# Patient Record
Sex: FEMALE | Race: BLACK OR AFRICAN AMERICAN | NOT HISPANIC OR LATINO
[De-identification: names, ages, dates, MRNs, and addresses within clinical notes are randomized per-mention and may not be internally consistent; named-entity substitution may affect disease eponyms.]

---

## 2017-01-31 ENCOUNTER — APPOINTMENT (OUTPATIENT)
Dept: OBGYN | Facility: CLINIC | Age: 57
End: 2017-01-31

## 2017-01-31 VITALS
HEART RATE: 70 BPM | DIASTOLIC BLOOD PRESSURE: 92 MMHG | BODY MASS INDEX: 32.67 KG/M2 | SYSTOLIC BLOOD PRESSURE: 142 MMHG | HEIGHT: 65 IN | WEIGHT: 196.1 LBS

## 2017-01-31 DIAGNOSIS — N92.6 IRREGULAR MENSTRUATION, UNSPECIFIED: ICD-10-CM

## 2017-02-10 LAB
CYTOLOGY CVX/VAG DOC THIN PREP: NORMAL
HPV HIGH+LOW RISK DNA PNL CVX: NEGATIVE

## 2017-02-14 ENCOUNTER — APPOINTMENT (OUTPATIENT)
Dept: OBGYN | Facility: CLINIC | Age: 57
End: 2017-02-14

## 2017-03-03 ENCOUNTER — APPOINTMENT (OUTPATIENT)
Dept: OBGYN | Facility: CLINIC | Age: 57
End: 2017-03-03

## 2017-03-03 VITALS
SYSTOLIC BLOOD PRESSURE: 147 MMHG | HEART RATE: 81 BPM | DIASTOLIC BLOOD PRESSURE: 91 MMHG | HEIGHT: 65 IN | BODY MASS INDEX: 31.16 KG/M2 | WEIGHT: 187 LBS

## 2017-03-06 RX ORDER — AMOXICILLIN 875 MG/1
875 TABLET, FILM COATED ORAL
Qty: 20 | Refills: 0 | Status: COMPLETED | COMMUNITY
Start: 2016-09-27

## 2017-03-06 RX ORDER — AMOXICILLIN AND CLAVULANATE POTASSIUM 875; 125 MG/1; MG/1
875-125 TABLET, COATED ORAL
Qty: 14 | Refills: 0 | Status: COMPLETED | COMMUNITY
Start: 2017-02-01

## 2017-04-12 ENCOUNTER — OUTPATIENT (OUTPATIENT)
Dept: OUTPATIENT SERVICES | Facility: HOSPITAL | Age: 57
LOS: 1 days | End: 2017-04-12
Payer: COMMERCIAL

## 2017-04-12 VITALS
RESPIRATION RATE: 15 BRPM | HEART RATE: 78 BPM | TEMPERATURE: 98 F | HEIGHT: 64.25 IN | WEIGHT: 192.02 LBS | DIASTOLIC BLOOD PRESSURE: 78 MMHG | SYSTOLIC BLOOD PRESSURE: 122 MMHG

## 2017-04-12 DIAGNOSIS — S83.519A SPRAIN OF ANTERIOR CRUCIATE LIGAMENT OF UNSPECIFIED KNEE, INITIAL ENCOUNTER: Chronic | ICD-10-CM

## 2017-04-12 DIAGNOSIS — Z98.51 TUBAL LIGATION STATUS: Chronic | ICD-10-CM

## 2017-04-12 DIAGNOSIS — D25.9 LEIOMYOMA OF UTERUS, UNSPECIFIED: ICD-10-CM

## 2017-04-12 LAB
BLD GP AB SCN SERPL QL: NEGATIVE — SIGNIFICANT CHANGE UP
BUN SERPL-MCNC: 21 MG/DL — SIGNIFICANT CHANGE UP (ref 7–23)
CALCIUM SERPL-MCNC: 10.5 MG/DL — SIGNIFICANT CHANGE UP (ref 8.4–10.5)
CHLORIDE SERPL-SCNC: 100 MMOL/L — SIGNIFICANT CHANGE UP (ref 98–107)
CO2 SERPL-SCNC: 31 MMOL/L — SIGNIFICANT CHANGE UP (ref 22–31)
CREAT SERPL-MCNC: 1.28 MG/DL — SIGNIFICANT CHANGE UP (ref 0.5–1.3)
GLUCOSE SERPL-MCNC: 83 MG/DL — SIGNIFICANT CHANGE UP (ref 70–99)
HCT VFR BLD CALC: 37.3 % — SIGNIFICANT CHANGE UP (ref 34.5–45)
HGB BLD-MCNC: 11.8 G/DL — SIGNIFICANT CHANGE UP (ref 11.5–15.5)
MCHC RBC-ENTMCNC: 25.3 PG — LOW (ref 27–34)
MCHC RBC-ENTMCNC: 31.6 % — LOW (ref 32–36)
MCV RBC AUTO: 79.9 FL — LOW (ref 80–100)
PLATELET # BLD AUTO: 259 K/UL — SIGNIFICANT CHANGE UP (ref 150–400)
PMV BLD: 10.6 FL — SIGNIFICANT CHANGE UP (ref 7–13)
POTASSIUM SERPL-MCNC: 3.3 MMOL/L — LOW (ref 3.5–5.3)
POTASSIUM SERPL-SCNC: 3.3 MMOL/L — LOW (ref 3.5–5.3)
RBC # BLD: 4.67 M/UL — SIGNIFICANT CHANGE UP (ref 3.8–5.2)
RBC # FLD: 15.6 % — HIGH (ref 10.3–14.5)
RH IG SCN BLD-IMP: POSITIVE — SIGNIFICANT CHANGE UP
SODIUM SERPL-SCNC: 145 MMOL/L — SIGNIFICANT CHANGE UP (ref 135–145)
WBC # BLD: 3.43 K/UL — LOW (ref 3.8–10.5)
WBC # FLD AUTO: 3.43 K/UL — LOW (ref 3.8–10.5)

## 2017-04-12 PROCEDURE — 93010 ELECTROCARDIOGRAM REPORT: CPT

## 2017-04-12 NOTE — H&P PST ADULT - GENITOURINARY COMMENTS
hx of post menopausal vaginal bleeding, hx of fibroids.  Pt reports fibroid had enlarged on transvaginal ultrasound. Now scheduled for Dilation curettage Hysteroscopy, margarito resectoscope, myomectomy 4/24/17.

## 2017-04-12 NOTE — H&P PST ADULT - NSANTHOSAYNRD_GEN_A_CORE
No. AUGUSTIN screening performed.  STOP BANG Legend: 0-2 = LOW Risk; 3-4 = INTERMEDIATE Risk; 5-8 = HIGH Risk

## 2017-04-12 NOTE — H&P PST ADULT - VISION (WITH CORRECTIVE LENSES IF THE PATIENT USUALLY WEARS THEM):
wearing contacts at this time instructed to wear glasses for day of surgery/Partially impaired: cannot see medication labels or newsprint, but can see obstacles in path, and the surrounding layout; can count fingers at arm's length

## 2017-04-12 NOTE — H&P PST ADULT - ASSESSMENT
57 y/o with hx of post menopausal vaginal bleeding, hx of fibroids.  Pt reports fibroid had enlarged on transvaginal ultrasound. Now scheduled for Dilation curettage Hysteroscopy, [place] resectoscope, myomectomy 4/24/17.

## 2017-04-12 NOTE — H&P PST ADULT - PROBLEM SELECTOR PLAN 1
Dilation curettage Hysteroscopy, margarito resectoscope, myomectomy 4/24/17.      CBC BMP T&S EKG    AUGUSTIN precautions, per Stop Bang Criteria, OR booking informed

## 2017-04-12 NOTE — H&P PST ADULT - HISTORY OF PRESENT ILLNESS
55 y/o with hx of post menopausal vaginal bleeding, hx of fibroids.  Pt reports fibroid had enlarged on transvaginal ultrasound. Now scheduled for Dilation curettage Hysteroscopy, margarito resectoscope, myomectomy 4/24/17.

## 2017-04-21 NOTE — ASU PATIENT PROFILE, ADULT - PSH
ACL (anterior cruciate ligament) tear  ACL reconstruction 2016 (L)  History of bilateral tubal ligation  1999

## 2017-04-23 ENCOUNTER — RESULT REVIEW (OUTPATIENT)
Age: 57
End: 2017-04-23

## 2017-04-24 ENCOUNTER — OUTPATIENT (OUTPATIENT)
Dept: OUTPATIENT SERVICES | Facility: HOSPITAL | Age: 57
LOS: 1 days | Discharge: HOME | End: 2017-04-24
Payer: COMMERCIAL

## 2017-04-24 ENCOUNTER — APPOINTMENT (OUTPATIENT)
Dept: OBGYN | Facility: HOSPITAL | Age: 57
End: 2017-04-24

## 2017-04-24 VITALS
RESPIRATION RATE: 15 BRPM | DIASTOLIC BLOOD PRESSURE: 77 MMHG | OXYGEN SATURATION: 95 % | HEART RATE: 82 BPM | SYSTOLIC BLOOD PRESSURE: 114 MMHG

## 2017-04-24 VITALS
TEMPERATURE: 98 F | HEART RATE: 80 BPM | OXYGEN SATURATION: 97 % | DIASTOLIC BLOOD PRESSURE: 79 MMHG | SYSTOLIC BLOOD PRESSURE: 132 MMHG | WEIGHT: 192.02 LBS | RESPIRATION RATE: 15 BRPM | HEIGHT: 64.25 IN

## 2017-04-24 DIAGNOSIS — D25.9 LEIOMYOMA OF UTERUS, UNSPECIFIED: ICD-10-CM

## 2017-04-24 DIAGNOSIS — Z98.51 TUBAL LIGATION STATUS: Chronic | ICD-10-CM

## 2017-04-24 DIAGNOSIS — S83.519A SPRAIN OF ANTERIOR CRUCIATE LIGAMENT OF UNSPECIFIED KNEE, INITIAL ENCOUNTER: Chronic | ICD-10-CM

## 2017-04-24 LAB — RH IG SCN BLD-IMP: POSITIVE — SIGNIFICANT CHANGE UP

## 2017-04-24 PROCEDURE — 88305 TISSUE EXAM BY PATHOLOGIST: CPT | Mod: 26

## 2017-04-24 PROCEDURE — 58561 HYSTEROSCOPY REMOVE MYOMA: CPT

## 2017-04-24 PROCEDURE — 58120 DILATION AND CURETTAGE: CPT

## 2017-04-24 RX ORDER — SODIUM CHLORIDE 9 MG/ML
1000 INJECTION, SOLUTION INTRAVENOUS
Qty: 0 | Refills: 0 | Status: DISCONTINUED | OUTPATIENT
Start: 2017-04-24 | End: 2017-04-25

## 2017-04-24 NOTE — ASU DISCHARGE PLAN (ADULT/PEDIATRIC). - ACTIVITY LEVEL
weight bearing as tolerated/no douching/no intercourse/nothing per vagina/no tub baths/no tampons/no exercise

## 2017-04-24 NOTE — ASU DISCHARGE PLAN (ADULT/PEDIATRIC). - ITEMS TO FOLLOWUP WITH YOUR PHYSICIAN'S
Follow up with Dr Delaney in 2 weeks for a postoperative appointment. Call the office for appointment confirmation or with any concerns. Take pain medication as you need it. DO NOT take sabina back (contains aspirin) with ibuprofen (NSAID), you may take tylenol with it.

## 2017-04-24 NOTE — ASU DISCHARGE PLAN (ADULT/PEDIATRIC). - NURSING INSTRUCTIONS
You were given 1000mg IV Tylenol for pain management.  Please DO NOT take any Tylenol containing products, such as  Vicodin, Percocet, Excedrin, many cold preparations for the next 8 hours (until 9p).  DO NOT EXCEED 3000MG OF TYLENOL OVER 24 HOURS.   You were given Toradol for pain management. Please DO Not take Motrin/Ibuprofen/Advil/Aleve (NSAIDS) for the next 6 hours (Until 645p)

## 2017-04-24 NOTE — ASU DISCHARGE PLAN (ADULT/PEDIATRIC). - MEDICATION SUMMARY - MEDICATIONS TO TAKE
I will START or STAY ON the medications listed below when I get home from the hospital:    garlic oral tablet 100 mg  -- 1 tab(s) by mouth once a day in am last dose on 4/12/17  -- Indication: For home med    magnesium 500 mg (OTC)  -- 1 tab(s) by mouth once a day  -- Indication: For home med    enzymes (OTC)  -- 1 cap(s) by mouth once a day last dose on 4/12/17  -- Indication: For home med    ibuprofen 600 mg oral tablet  -- 1 tab(s) by mouth every 6 hours, As Needed  -- Indication: For postoperative pain    Rosalind Back & Body 500 mg-32.5 mg oral tablet  -- 2 tab(s) by mouth every 6 hours, As Needed  last dose on 4/8/17  -- Indication: For home med, do not take with ibuprofen    ZyrTEC  -- 1 tab(s) by mouth once a day  -- Indication: For home med    Micardis HCT 40 mg-12.5 mg oral tablet  -- 1 tab(s) by mouth once a day in am  -- Indication: For home med    Flonase 50 mcg/inh nasal spray  -- 1 spray(s) into nose once a day in am  -- Indication: For home med    Probiotic Formula oral capsule  -- 1 cap(s) by mouth once a day  -- Indication: For home med    Vitamin D3 1000 intl units oral capsule  -- 1 cap(s) by mouth once a day in am  -- Indication: For home med    biotin 5000 mcg oral tablet, disintegrating  -- 1 tab(s) by mouth once a day in am last dose on 4/12/17  -- Indication: For home med

## 2017-04-24 NOTE — ASU DISCHARGE PLAN (ADULT/PEDIATRIC). - NOTIFY
Bleeding that does not stop/Pain not relieved by Medications/Unable to Urinate/GYN Fever>100.4/Persistent Nausea and Vomiting

## 2017-04-26 ENCOUNTER — TRANSCRIPTION ENCOUNTER (OUTPATIENT)
Age: 57
End: 2017-04-26

## 2017-05-11 ENCOUNTER — APPOINTMENT (OUTPATIENT)
Dept: OBGYN | Facility: CLINIC | Age: 57
End: 2017-05-11

## 2017-05-11 VITALS
HEART RATE: 97 BPM | BODY MASS INDEX: 32.82 KG/M2 | HEIGHT: 65 IN | WEIGHT: 197 LBS | SYSTOLIC BLOOD PRESSURE: 136 MMHG | DIASTOLIC BLOOD PRESSURE: 99 MMHG

## 2017-05-11 DIAGNOSIS — Z86.018 PERSONAL HISTORY OF OTHER BENIGN NEOPLASM: ICD-10-CM

## 2017-05-11 DIAGNOSIS — N95.0 POSTMENOPAUSAL BLEEDING: ICD-10-CM

## 2017-05-14 PROBLEM — N95.0 POSTMENOPAUSAL BLEEDING: Status: RESOLVED | Noted: 2017-01-31 | Resolved: 2017-05-14

## 2018-02-08 ENCOUNTER — APPOINTMENT (OUTPATIENT)
Dept: OBGYN | Facility: CLINIC | Age: 58
End: 2018-02-08
Payer: COMMERCIAL

## 2018-02-08 VITALS
WEIGHT: 193 LBS | HEART RATE: 88 BPM | SYSTOLIC BLOOD PRESSURE: 142 MMHG | DIASTOLIC BLOOD PRESSURE: 87 MMHG | HEIGHT: 65 IN | BODY MASS INDEX: 32.15 KG/M2

## 2018-02-08 DIAGNOSIS — Z12.31 ENCOUNTER FOR SCREENING MAMMOGRAM FOR MALIGNANT NEOPLASM OF BREAST: ICD-10-CM

## 2018-02-08 DIAGNOSIS — Z98.890 OTHER SPECIFIED POSTPROCEDURAL STATES: ICD-10-CM

## 2018-02-08 PROCEDURE — 99396 PREV VISIT EST AGE 40-64: CPT

## 2018-02-18 PROBLEM — Z12.31 BREAST SCREENING: Status: ACTIVE | Noted: 2018-02-18

## 2018-02-18 PROBLEM — Z98.890 POST-OPERATIVE STATE: Status: RESOLVED | Noted: 2017-05-14 | Resolved: 2018-02-18

## 2019-02-04 PROBLEM — I10 ESSENTIAL (PRIMARY) HYPERTENSION: Chronic | Status: ACTIVE | Noted: 2017-04-12

## 2019-03-15 ENCOUNTER — TRANSCRIPTION ENCOUNTER (OUTPATIENT)
Age: 59
End: 2019-03-15

## 2019-03-15 ENCOUNTER — APPOINTMENT (OUTPATIENT)
Dept: OBGYN | Facility: CLINIC | Age: 59
End: 2019-03-15
Payer: COMMERCIAL

## 2019-03-15 VITALS
WEIGHT: 190 LBS | SYSTOLIC BLOOD PRESSURE: 123 MMHG | HEIGHT: 65 IN | HEART RATE: 87 BPM | BODY MASS INDEX: 31.65 KG/M2 | DIASTOLIC BLOOD PRESSURE: 77 MMHG

## 2019-03-15 PROCEDURE — 99396 PREV VISIT EST AGE 40-64: CPT

## 2019-03-15 NOTE — PHYSICAL EXAM
[Awake] : awake [Alert] : alert [Acute Distress] : no acute distress [LAD] : no lymphadenopathy [Thyroid Nodule] : no thyroid nodule [Goiter] : no goiter [Mass] : no breast mass [Nipple Discharge] : no nipple discharge [Axillary LAD] : no axillary lymphadenopathy [Soft] : soft [Tender] : non tender [Distended] : not distended [Oriented x3] : oriented to person, place, and time [Depressed Mood] : not depressed [Normal] : cervix [Atrophy] : atrophy [Pap Obtained] : a Pap smear was performed [Anteversion] : anteverted [Tenderness] : nontender [Uterine Adnexae] : were not tender and not enlarged [No Tenderness] : no rectal tenderness [Nl Sphincter Tone] : normal sphincter tone [FreeTextEntry9] : no masses, no nodules

## 2019-03-15 NOTE — HISTORY OF PRESENT ILLNESS
[1 Year Ago] : 1 year ago [Good] : being in good health [Last Mammogram ___] : Last Mammogram was [unfilled] [Last Colonoscopy ___] : Last colonoscopy [unfilled] [Last Pap ___] : Last cervical pap smear was [unfilled] [Sexually Active] : is sexually active [Monogamous] : is monogamous

## 2019-03-25 LAB
CYTOLOGY CVX/VAG DOC THIN PREP: NORMAL
HPV HIGH+LOW RISK DNA PNL CVX: NOT DETECTED

## 2020-07-28 ENCOUNTER — APPOINTMENT (OUTPATIENT)
Dept: OBGYN | Facility: CLINIC | Age: 60
End: 2020-07-28
Payer: COMMERCIAL

## 2020-07-28 VITALS
WEIGHT: 179 LBS | SYSTOLIC BLOOD PRESSURE: 120 MMHG | HEIGHT: 65 IN | TEMPERATURE: 97.6 F | BODY MASS INDEX: 29.82 KG/M2 | DIASTOLIC BLOOD PRESSURE: 80 MMHG

## 2020-07-28 DIAGNOSIS — R92.2 INCONCLUSIVE MAMMOGRAM: ICD-10-CM

## 2020-07-28 PROCEDURE — 99396 PREV VISIT EST AGE 40-64: CPT

## 2020-07-28 NOTE — PHYSICAL EXAM
[Awake] : awake [Alert] : alert [Acute Distress] : no acute distress [LAD] : no lymphadenopathy [Thyroid Nodule] : no thyroid nodule [Goiter] : no goiter [Mass] : no breast mass [Nipple Discharge] : no nipple discharge [Axillary LAD] : no axillary lymphadenopathy [Soft] : soft [Tender] : non tender [Distended] : not distended [Oriented x3] : oriented to person, place, and time [Depressed Mood] : not depressed [Atrophy] : atrophy [Normal] : cervix [Pap Obtained] : a Pap smear was performed [Anteversion] : anteverted [Tenderness] : nontender [Uterine Adnexae] : were not tender and not enlarged [No Tenderness] : no rectal tenderness [Nl Sphincter Tone] : normal sphincter tone [FreeTextEntry9] : no masses, no nodules

## 2020-07-28 NOTE — HISTORY OF PRESENT ILLNESS
[Post-Menopause, No Sxs] : post-menopausal, currently without symptoms [1 Year Ago] : 1 year ago [Good] : being in good health [Last Mammogram ___] : Last Mammogram was [unfilled] [Last Bone Density ___] : Last bone density studies [unfilled] [Last Colonoscopy ___] : Last colonoscopy [unfilled] [Last Pap ___] : Last cervical pap smear was [unfilled] [Sexually Active] : is sexually active [NA] : N/A [Monogamous] : is monogamous

## 2020-08-07 LAB — HPV HIGH+LOW RISK DNA PNL CVX: NOT DETECTED

## 2020-08-15 NOTE — ASU PATIENT PROFILE, ADULT - ABLE TO REACH PT
08/14/20 1552   Post-Acute Status   Post-Acute Authorization Home Health   HME Status Set-up Complete       Pt approved for admission with [facility]    8/14/2020 4:30:04 PM Note: Yes we will admit this weekend  Irene [email or web address]@[facility]  8/14/2020 4:29:53 PM Accepted  Irene [email or web address]@[facility]  8/14/2020 3:50:47 PM New: Yadira Garcia, RN Please let me know if you are in network. jose ramon Medel   yes

## 2020-10-08 NOTE — H&P PST ADULT - PSH
Good morning.  Mrs Salinas is scheduled to see Dr Gurrola for an abnormal EKG.  She is scheduled to have surgery for a intersten placement on November 23.  It was scheduled for October 12th but the pt asked for it to be rescheduled.    ACL (anterior cruciate ligament) tear  ACL reconstruction 2016 (L)  History of bilateral tubal ligation  1999

## 2021-08-17 ENCOUNTER — APPOINTMENT (OUTPATIENT)
Dept: OBGYN | Facility: CLINIC | Age: 61
End: 2021-08-17

## 2021-08-19 ENCOUNTER — APPOINTMENT (OUTPATIENT)
Dept: OBGYN | Facility: CLINIC | Age: 61
End: 2021-08-19

## 2021-11-20 ENCOUNTER — EMERGENCY (EMERGENCY)
Facility: HOSPITAL | Age: 61
LOS: 1 days | Discharge: HOME | End: 2021-11-20
Attending: STUDENT IN AN ORGANIZED HEALTH CARE EDUCATION/TRAINING PROGRAM | Admitting: STUDENT IN AN ORGANIZED HEALTH CARE EDUCATION/TRAINING PROGRAM
Payer: COMMERCIAL

## 2021-11-20 VITALS
HEART RATE: 82 BPM | SYSTOLIC BLOOD PRESSURE: 180 MMHG | HEIGHT: 64.25 IN | TEMPERATURE: 97 F | RESPIRATION RATE: 16 BRPM | DIASTOLIC BLOOD PRESSURE: 90 MMHG | OXYGEN SATURATION: 100 %

## 2021-11-20 VITALS
OXYGEN SATURATION: 100 % | RESPIRATION RATE: 18 BRPM | DIASTOLIC BLOOD PRESSURE: 94 MMHG | SYSTOLIC BLOOD PRESSURE: 148 MMHG | TEMPERATURE: 98 F | HEART RATE: 74 BPM

## 2021-11-20 DIAGNOSIS — Z98.51 TUBAL LIGATION STATUS: Chronic | ICD-10-CM

## 2021-11-20 DIAGNOSIS — E83.52 HYPERCALCEMIA: ICD-10-CM

## 2021-11-20 DIAGNOSIS — S83.519A SPRAIN OF ANTERIOR CRUCIATE LIGAMENT OF UNSPECIFIED KNEE, INITIAL ENCOUNTER: Chronic | ICD-10-CM

## 2021-11-20 LAB
ALBUMIN SERPL ELPH-MCNC: 3.3 G/DL — SIGNIFICANT CHANGE UP (ref 3.3–5)
ALBUMIN SERPL ELPH-MCNC: 4.2 G/DL — SIGNIFICANT CHANGE UP (ref 3.3–5)
ALP SERPL-CCNC: 100 U/L — SIGNIFICANT CHANGE UP (ref 40–120)
ALP SERPL-CCNC: 85 U/L — SIGNIFICANT CHANGE UP (ref 40–120)
ALT FLD-CCNC: 23 U/L — SIGNIFICANT CHANGE UP (ref 4–33)
ALT FLD-CCNC: 25 U/L — SIGNIFICANT CHANGE UP (ref 4–33)
ANION GAP SERPL CALC-SCNC: 11 MMOL/L — SIGNIFICANT CHANGE UP (ref 7–14)
ANION GAP SERPL CALC-SCNC: 8 MMOL/L — SIGNIFICANT CHANGE UP (ref 7–14)
AST SERPL-CCNC: 22 U/L — SIGNIFICANT CHANGE UP (ref 4–32)
AST SERPL-CCNC: 24 U/L — SIGNIFICANT CHANGE UP (ref 4–32)
BASOPHILS # BLD AUTO: 0.02 K/UL — SIGNIFICANT CHANGE UP (ref 0–0.2)
BASOPHILS NFR BLD AUTO: 0.3 % — SIGNIFICANT CHANGE UP (ref 0–2)
BILIRUB SERPL-MCNC: 0.3 MG/DL — SIGNIFICANT CHANGE UP (ref 0.2–1.2)
BILIRUB SERPL-MCNC: 0.4 MG/DL — SIGNIFICANT CHANGE UP (ref 0.2–1.2)
BUN SERPL-MCNC: 23 MG/DL — SIGNIFICANT CHANGE UP (ref 7–23)
BUN SERPL-MCNC: 24 MG/DL — HIGH (ref 7–23)
CA-I BLD-SCNC: 1.86 MMOL/L — HIGH (ref 1.15–1.29)
CALCIUM SERPL-MCNC: 12.1 MG/DL — HIGH (ref 8.4–10.5)
CALCIUM SERPL-MCNC: 14.2 MG/DL — CRITICAL HIGH (ref 8.4–10.5)
CHLORIDE SERPL-SCNC: 102 MMOL/L — SIGNIFICANT CHANGE UP (ref 98–107)
CHLORIDE SERPL-SCNC: 108 MMOL/L — HIGH (ref 98–107)
CO2 SERPL-SCNC: 26 MMOL/L — SIGNIFICANT CHANGE UP (ref 22–31)
CO2 SERPL-SCNC: 26 MMOL/L — SIGNIFICANT CHANGE UP (ref 22–31)
CREAT SERPL-MCNC: 1.55 MG/DL — HIGH (ref 0.5–1.3)
CREAT SERPL-MCNC: 1.61 MG/DL — HIGH (ref 0.5–1.3)
EOSINOPHIL # BLD AUTO: 0.25 K/UL — SIGNIFICANT CHANGE UP (ref 0–0.5)
EOSINOPHIL NFR BLD AUTO: 4.2 % — SIGNIFICANT CHANGE UP (ref 0–6)
GLUCOSE SERPL-MCNC: 111 MG/DL — HIGH (ref 70–99)
GLUCOSE SERPL-MCNC: 88 MG/DL — SIGNIFICANT CHANGE UP (ref 70–99)
HCT VFR BLD CALC: 37.5 % — SIGNIFICANT CHANGE UP (ref 34.5–45)
HGB BLD-MCNC: 11.6 G/DL — SIGNIFICANT CHANGE UP (ref 11.5–15.5)
IANC: 3.81 K/UL — SIGNIFICANT CHANGE UP (ref 1.5–8.5)
IMM GRANULOCYTES NFR BLD AUTO: 0.2 % — SIGNIFICANT CHANGE UP (ref 0–1.5)
LYMPHOCYTES # BLD AUTO: 1.11 K/UL — SIGNIFICANT CHANGE UP (ref 1–3.3)
LYMPHOCYTES # BLD AUTO: 18.8 % — SIGNIFICANT CHANGE UP (ref 13–44)
MAGNESIUM SERPL-MCNC: 2.1 MG/DL — SIGNIFICANT CHANGE UP (ref 1.6–2.6)
MCHC RBC-ENTMCNC: 25.1 PG — LOW (ref 27–34)
MCHC RBC-ENTMCNC: 30.9 GM/DL — LOW (ref 32–36)
MCV RBC AUTO: 81 FL — SIGNIFICANT CHANGE UP (ref 80–100)
MONOCYTES # BLD AUTO: 0.69 K/UL — SIGNIFICANT CHANGE UP (ref 0–0.9)
MONOCYTES NFR BLD AUTO: 11.7 % — SIGNIFICANT CHANGE UP (ref 2–14)
NEUTROPHILS # BLD AUTO: 3.81 K/UL — SIGNIFICANT CHANGE UP (ref 1.8–7.4)
NEUTROPHILS NFR BLD AUTO: 64.8 % — SIGNIFICANT CHANGE UP (ref 43–77)
NRBC # BLD: 0 /100 WBCS — SIGNIFICANT CHANGE UP
NRBC # FLD: 0 K/UL — SIGNIFICANT CHANGE UP
PHOSPHATE SERPL-MCNC: 3.9 MG/DL — SIGNIFICANT CHANGE UP (ref 2.5–4.5)
PLATELET # BLD AUTO: 280 K/UL — SIGNIFICANT CHANGE UP (ref 150–400)
POTASSIUM SERPL-MCNC: 3.3 MMOL/L — LOW (ref 3.5–5.3)
POTASSIUM SERPL-MCNC: 3.5 MMOL/L — SIGNIFICANT CHANGE UP (ref 3.5–5.3)
POTASSIUM SERPL-SCNC: 3.3 MMOL/L — LOW (ref 3.5–5.3)
POTASSIUM SERPL-SCNC: 3.5 MMOL/L — SIGNIFICANT CHANGE UP (ref 3.5–5.3)
PROT SERPL-MCNC: 6.2 G/DL — SIGNIFICANT CHANGE UP (ref 6–8.3)
PROT SERPL-MCNC: 7.6 G/DL — SIGNIFICANT CHANGE UP (ref 6–8.3)
RBC # BLD: 4.63 M/UL — SIGNIFICANT CHANGE UP (ref 3.8–5.2)
RBC # FLD: 15.2 % — HIGH (ref 10.3–14.5)
SARS-COV-2 RNA SPEC QL NAA+PROBE: SIGNIFICANT CHANGE UP
SODIUM SERPL-SCNC: 139 MMOL/L — SIGNIFICANT CHANGE UP (ref 135–145)
SODIUM SERPL-SCNC: 142 MMOL/L — SIGNIFICANT CHANGE UP (ref 135–145)
WBC # BLD: 5.89 K/UL — SIGNIFICANT CHANGE UP (ref 3.8–10.5)
WBC # FLD AUTO: 5.89 K/UL — SIGNIFICANT CHANGE UP (ref 3.8–10.5)

## 2021-11-20 PROCEDURE — 99283 EMERGENCY DEPT VISIT LOW MDM: CPT

## 2021-11-20 PROCEDURE — 99284 EMERGENCY DEPT VISIT MOD MDM: CPT

## 2021-11-20 RX ORDER — SODIUM CHLORIDE 9 MG/ML
1000 INJECTION INTRAMUSCULAR; INTRAVENOUS; SUBCUTANEOUS ONCE
Refills: 0 | Status: COMPLETED | OUTPATIENT
Start: 2021-11-20 | End: 2021-11-20

## 2021-11-20 RX ORDER — SODIUM CHLORIDE 9 MG/ML
1000 INJECTION INTRAMUSCULAR; INTRAVENOUS; SUBCUTANEOUS ONCE
Refills: 0 | Status: DISCONTINUED | OUTPATIENT
Start: 2021-11-20 | End: 2021-11-20

## 2021-11-20 RX ADMIN — SODIUM CHLORIDE 1000 MILLILITER(S): 9 INJECTION INTRAMUSCULAR; INTRAVENOUS; SUBCUTANEOUS at 13:53

## 2021-11-20 RX ADMIN — SODIUM CHLORIDE 1000 MILLILITER(S): 9 INJECTION INTRAMUSCULAR; INTRAVENOUS; SUBCUTANEOUS at 12:12

## 2021-11-20 NOTE — ED PROVIDER NOTE - ATTENDING CONTRIBUTION TO CARE
62 yo female with PMH HTN, being worked up for possible multiple myeloma, presents for evaluation after being found to have elevated calcium on bloodwork yesterday. PE: AAox3, RRR, CTA BL A/P Labs, hydration, reassess

## 2021-11-20 NOTE — ED PROVIDER NOTE - OBJECTIVE STATEMENT
60 y/o F w/ hx of HTN p/w abnormal lab work. States she is being worked up for multiple myeloma by a heme/onc doc at [facility]. Had blood work done yesterday which showed Ca level of 14. Was told to go to the ED for hydration. Pt denies recent f/c, n/v, cp, sob, constipation, numbness or tingling distally.

## 2021-11-20 NOTE — ED PROVIDER NOTE - NSFOLLOWUPINSTRUCTIONS_ED_ALL_ED_FT
(1) Follow up with your primary care physician and hematologist as discussed.   (2) Immediately seek care at your nearest emergency room if your symptoms worsen, persist, or do not resolve   (3) Take Tylenol as needed for pain per the dosing instructions on the bottle.      Hypercalcemia    WHAT YOU NEED TO KNOW:    Hypercalcemia is a high level of calcium in your blood. Calcium levels are kept in balance by your parathyroid glands. Your parathyroid glands are located in your neck near your thyroid gland.    DISCHARGE INSTRUCTIONS:    Medicines:   •Medicines may be given to help lower your calcium level or to treat the cause of your hypercalcemia.     •Take your medicine as directed. Contact your healthcare provider if you think your medicine is not helping or if you have side effects. Tell him or her if you are allergic to any medicine. Keep a list of the medicines, vitamins, and herbs you take. Include the amounts, and when and why you take them. Bring the list or the pill bottles to follow-up visits. Carry your medicine list with you in case of an emergency.    Follow up with your healthcare provider as directed: Write down your questions so you remember to ask them during your visits.     Self-care:   •Drink liquids as directed. Liquids will help prevent dehydration and kidney stones. Ask your healthcare provider how much liquid to drink each day and which liquids are best for you.     •Follow your healthcare provider's advice on ways to stay active. Activity can help prevent your hypercalcemia from getting worse.     Contact your healthcare provider if:   •You feel confused, or you have trouble concentrating.  •You have symptoms of dehydration, such as increased thirst, dark yellow urine, and little or no urine.   •You have new or worsening symptoms.   •You have questions or concerns about your condition or care.    Return to the emergency department if:   •You feel fluttering or jumping in your chest.  •You feel like you are going to pass out.   •You have nausea and you are vomiting.   •You have pain in the middle of your back that moves across to your side, or that spreads to your groin.

## 2021-11-20 NOTE — ED PROVIDER NOTE - PROGRESS NOTE DETAILS
Juan WILSON PGY-2: Results explained to patient. Explained strict return precautions. Pt states she wants to go home as her baseline Ca level is in the 12's. Offered CDU but pt refused.

## 2021-11-20 NOTE — CONSULT NOTE ADULT - ASSESSMENT
62 yo female being evaluated for presumed oncology cause of hypercalcemia, present for at least 6 months. Prior w/u suppressed PTH, PTHRP but very high 1,25 Vit D , more suggestive of granulomatous disease. Presents with marked hypercalcemia but is essentially asympt. Has already rec'd aggressive hydration.   Recommend repeat serum calcium. If stable > 13, recommend IV pamidronate 60 mg iv infusion.  Case disc with Dr Pandey's coverage

## 2021-11-20 NOTE — CONSULT NOTE ADULT - SUBJECTIVE AND OBJECTIVE BOX
HPI:  60 yo female in generally good health referred for Ca 14.0 from oncology. The pt presented earlier this year with calcium 12, no prior hx. Saw endocrine in [facility] system, w/u included suppressed PTH, suppressed PTHRP low normal 25 Vit D but high 1,25 Vit D of 230. Pt was referred to oncology for myeloma evaluation. Dr Pandey [phone]. Routine labs yest Ca 14, prior 12.9 Pt denies any sx such as dehydration, polyuria, anorexia. No h/o fx, stones. Recent BMD above average  Pt had been on a thiazide diuretic which was stopped for hypercalcemia, with no change in Ca.    PAST MEDICAL & SURGICAL HISTORY:  Hypertension    Obesity    Acid reflux    Fibroid, uterine    ACL (anterior cruciate ligament) tear  ACL reconstruction 2016 (L)    History of bilateral tubal ligation  1999        FAMILY HISTORY:  Family history of hypertension (Mother)        Social History: -cigs    Outpatient Medications: Metoprolol, vits, fish oil, garlic Vit D 2000    MEDICATIONS  (STANDING):    MEDICATIONS  (PRN):      Allergies    No Known Drug Allergies  seasonal allergies: sinus congestion, itchy eyes (Rhinitis)       Review of Systems:  Constitutional: No fever  Eyes: No blurry vision  Neuro: No tremors  HEENT: No pain  Cardiovascular: No chest pain, palpitations  Respiratory: No SOB, no cough  GI: No nausea, vomiting, abdominal pain  : No dysuria  Skin: no rash  Psych: no depression  Endocrine: no polyuria, polydipsia  Hem/lymph: no swelling  Osteoporosis: no fractures       PHYSICAL EXAM:  VITALS: T(C): 36.1 (11-20-21 @ 12:06)  T(F): 97 (11-20-21 @ 12:06), Max: 97.2 (11-20-21 @ 11:14)  HR: 75 (11-20-21 @ 12:06) (75 - 82)  BP: 157/94 (11-20-21 @ 12:06) (157/94 - 180/90)  RR:  (16 - 17)  SpO2:  (98% - 100%)  Wt(kg): --  GENERAL: NAD, well-groomed, well-developed  EYES: No proptosis, no lid lag, anicteric  HEENT:  Atraumatic, Normocephalic, moist mucous membranes  THYROID: Normal size, no palpable nodules  RESPIRATORY: Clear to auscultation bilaterally; No rales, rhonchi, wheezing, or rubs  CARDIOVASCULAR: Regular rate and rhythm; No murmurs; no peripheral edema  GI: Soft, nontender, non distended, normal bowel sounds  SKIN: Dry, intact, No rashes or lesions  PSYCH: Alert and oriented x 3, normal affect, normal mood  CUSHING'S SIGNS: no striae                              11.6   5.89  )-----------( 280      ( 20 Nov 2021 12:06 )             37.5       11-20    139  |  102  |  24<H>  ----------------------------<  88  3.5   |  26  |  1.61<H>    EGFR if : 40<L>  EGFR if non : 35<L>    Ca    14.2<HH>      11-20  Mg     2.10     11-20  Phos  3.9     11-20    TPro  7.6  /  Alb  4.2  /  TBili  0.4  /  DBili  x   /  AST  24  /  ALT  25  /  AlkPhos  100  11-20      Thyroid Function Tests:              Radiology:

## 2021-11-20 NOTE — ED PROVIDER NOTE - PHYSICAL EXAMINATION
CONSTITUTIONAL: Well-developed; well-nourished; in no acute distress.   SKIN: warm, dry  HEAD: Normocephalic; atraumatic.   EYES: no conjunctival injection. PERRL.   ENT: No nasal discharge; airway clear.  NECK: Supple; non tender.  CARD: S1, S2 normal; no murmurs, gallops, or rubs. Regular rate and rhythm.   RESP: No wheezes, rales or rhonchi. Good air movement bilaterally.   ABD: soft ntnd, no guarding, no distention, no rigidity.   EXT: Ambulates independently.  No cyanosis or edema.   NEURO: Alert, oriented, grossly unremarkable. negative Chovstek sign   PSYCH: Cooperative, appropriate.

## 2021-11-20 NOTE — ED ADULT NURSE NOTE - OBJECTIVE STATEMENT
Pt received to room 16 presents for abnormal lab work. Pt a&ox3, ambulatory at baseline, skin intact, respirations even and unlabored. As per pt, "went to my MD yesterday for blood workup to r/o multiple myeloma, blood work resulted with high calcium level, told to come to ER". Pt denies CP, SOB, fever, chills or any other symptoms. 20g IV placed in left arm, labs drawn and sent, will medicate as per ordered, will continue to monitor.

## 2021-11-20 NOTE — ED PROVIDER NOTE - CLINICAL SUMMARY MEDICAL DECISION MAKING FREE TEXT BOX
O'Marcia DO PGY-2: pt w/ hx of HTN presents due to high calcium level yesterday on outpatient lab work. pt is being worked up for multiple myeloma. Will rpt blood work and give IV hydration. negative Chovstek sign. Will reassess. Dispo pending workup. O'Marcia DO PGY-2: pt w/ hx of HTN presents due to high calcium level yesterday on outpatient lab work. pt is being worked up for multiple myeloma. Will rpt blood work, obtain ekg, and give IV hydration. negative Chovstek sign. Will reassess. Dispo pending workup.

## 2021-11-20 NOTE — ED ADULT TRIAGE NOTE - CHIEF COMPLAINT QUOTE
Pt states that she went for consult with heme/onc yesterday being worked up for possible multiple myeloma and was called today and advised her calcium level was elevated.  PMH HTN

## 2021-11-29 ENCOUNTER — INPATIENT (INPATIENT)
Facility: HOSPITAL | Age: 61
LOS: 0 days | Discharge: HOME | End: 2021-11-30
Attending: HOSPITALIST | Admitting: HOSPITALIST
Payer: COMMERCIAL

## 2021-11-29 VITALS
RESPIRATION RATE: 16 BRPM | DIASTOLIC BLOOD PRESSURE: 110 MMHG | HEIGHT: 64.25 IN | OXYGEN SATURATION: 100 % | TEMPERATURE: 98 F | SYSTOLIC BLOOD PRESSURE: 177 MMHG | HEART RATE: 98 BPM

## 2021-11-29 DIAGNOSIS — S83.519A SPRAIN OF ANTERIOR CRUCIATE LIGAMENT OF UNSPECIFIED KNEE, INITIAL ENCOUNTER: Chronic | ICD-10-CM

## 2021-11-29 DIAGNOSIS — Z29.9 ENCOUNTER FOR PROPHYLACTIC MEASURES, UNSPECIFIED: ICD-10-CM

## 2021-11-29 DIAGNOSIS — E83.52 HYPERCALCEMIA: ICD-10-CM

## 2021-11-29 DIAGNOSIS — E87.6 HYPOKALEMIA: ICD-10-CM

## 2021-11-29 DIAGNOSIS — N18.9 CHRONIC KIDNEY DISEASE, UNSPECIFIED: ICD-10-CM

## 2021-11-29 DIAGNOSIS — D25.9 LEIOMYOMA OF UTERUS, UNSPECIFIED: Chronic | ICD-10-CM

## 2021-11-29 DIAGNOSIS — I10 ESSENTIAL (PRIMARY) HYPERTENSION: ICD-10-CM

## 2021-11-29 DIAGNOSIS — Z98.51 TUBAL LIGATION STATUS: Chronic | ICD-10-CM

## 2021-11-29 LAB
ALBUMIN SERPL ELPH-MCNC: 4.1 G/DL — SIGNIFICANT CHANGE UP (ref 3.3–5)
ALP SERPL-CCNC: 94 U/L — SIGNIFICANT CHANGE UP (ref 40–120)
ALT FLD-CCNC: 21 U/L — SIGNIFICANT CHANGE UP (ref 4–33)
ANION GAP SERPL CALC-SCNC: 10 MMOL/L — SIGNIFICANT CHANGE UP (ref 7–14)
AST SERPL-CCNC: 20 U/L — SIGNIFICANT CHANGE UP (ref 4–32)
BASE EXCESS BLDV CALC-SCNC: 2.8 MMOL/L — SIGNIFICANT CHANGE UP (ref -2–3)
BASOPHILS # BLD AUTO: 0.02 K/UL — SIGNIFICANT CHANGE UP (ref 0–0.2)
BASOPHILS NFR BLD AUTO: 0.4 % — SIGNIFICANT CHANGE UP (ref 0–2)
BILIRUB SERPL-MCNC: 0.2 MG/DL — SIGNIFICANT CHANGE UP (ref 0.2–1.2)
BUN SERPL-MCNC: 27 MG/DL — HIGH (ref 7–23)
CA-I BLD-SCNC: 1.86 MMOL/L — HIGH (ref 1.15–1.29)
CA-I SERPL-SCNC: 1.94 MMOL/L — CRITICAL HIGH (ref 1.15–1.33)
CALCIUM SERPL-MCNC: 14.4 MG/DL — CRITICAL HIGH (ref 8.4–10.5)
CHLORIDE BLDV-SCNC: 104 MMOL/L — SIGNIFICANT CHANGE UP (ref 96–108)
CHLORIDE SERPL-SCNC: 102 MMOL/L — SIGNIFICANT CHANGE UP (ref 98–107)
CO2 BLDV-SCNC: 30.4 MMOL/L — HIGH (ref 22–26)
CO2 SERPL-SCNC: 27 MMOL/L — SIGNIFICANT CHANGE UP (ref 22–31)
CREAT SERPL-MCNC: 1.4 MG/DL — HIGH (ref 0.5–1.3)
EOSINOPHIL # BLD AUTO: 0.19 K/UL — SIGNIFICANT CHANGE UP (ref 0–0.5)
EOSINOPHIL NFR BLD AUTO: 3.9 % — SIGNIFICANT CHANGE UP (ref 0–6)
GAS PNL BLDV: 137 MMOL/L — SIGNIFICANT CHANGE UP (ref 136–145)
GAS PNL BLDV: SIGNIFICANT CHANGE UP
GLUCOSE BLDV-MCNC: 118 MG/DL — HIGH (ref 70–99)
GLUCOSE SERPL-MCNC: 119 MG/DL — HIGH (ref 70–99)
HCO3 BLDV-SCNC: 29 MMOL/L — SIGNIFICANT CHANGE UP (ref 22–29)
HCT VFR BLD CALC: 36.7 % — SIGNIFICANT CHANGE UP (ref 34.5–45)
HCT VFR BLDA CALC: 35 % — SIGNIFICANT CHANGE UP (ref 34.5–46.5)
HGB BLD CALC-MCNC: 11.8 G/DL — SIGNIFICANT CHANGE UP (ref 11.5–15.5)
HGB BLD-MCNC: 11.5 G/DL — SIGNIFICANT CHANGE UP (ref 11.5–15.5)
IANC: 3.11 K/UL — SIGNIFICANT CHANGE UP (ref 1.5–8.5)
IMM GRANULOCYTES NFR BLD AUTO: 0.2 % — SIGNIFICANT CHANGE UP (ref 0–1.5)
LACTATE BLDV-MCNC: 1.1 MMOL/L — SIGNIFICANT CHANGE UP (ref 0.5–2)
LYMPHOCYTES # BLD AUTO: 1.05 K/UL — SIGNIFICANT CHANGE UP (ref 1–3.3)
LYMPHOCYTES # BLD AUTO: 21.6 % — SIGNIFICANT CHANGE UP (ref 13–44)
MAGNESIUM SERPL-MCNC: 2 MG/DL — SIGNIFICANT CHANGE UP (ref 1.6–2.6)
MCHC RBC-ENTMCNC: 24.9 PG — LOW (ref 27–34)
MCHC RBC-ENTMCNC: 31.3 GM/DL — LOW (ref 32–36)
MCV RBC AUTO: 79.4 FL — LOW (ref 80–100)
MONOCYTES # BLD AUTO: 0.48 K/UL — SIGNIFICANT CHANGE UP (ref 0–0.9)
MONOCYTES NFR BLD AUTO: 9.9 % — SIGNIFICANT CHANGE UP (ref 2–14)
NEUTROPHILS # BLD AUTO: 3.11 K/UL — SIGNIFICANT CHANGE UP (ref 1.8–7.4)
NEUTROPHILS NFR BLD AUTO: 64 % — SIGNIFICANT CHANGE UP (ref 43–77)
NRBC # BLD: 0 /100 WBCS — SIGNIFICANT CHANGE UP
NRBC # FLD: 0 K/UL — SIGNIFICANT CHANGE UP
PCO2 BLDV: 50 MMHG — HIGH (ref 39–42)
PH BLDV: 7.37 — SIGNIFICANT CHANGE UP (ref 7.32–7.43)
PLATELET # BLD AUTO: 266 K/UL — SIGNIFICANT CHANGE UP (ref 150–400)
PO2 BLDV: 31 MMHG — SIGNIFICANT CHANGE UP
POTASSIUM BLDV-SCNC: 3.2 MMOL/L — LOW (ref 3.5–5.1)
POTASSIUM SERPL-MCNC: 3.3 MMOL/L — LOW (ref 3.5–5.3)
POTASSIUM SERPL-SCNC: 3.3 MMOL/L — LOW (ref 3.5–5.3)
PROT SERPL-MCNC: 7.7 G/DL — SIGNIFICANT CHANGE UP (ref 6–8.3)
RBC # BLD: 4.62 M/UL — SIGNIFICANT CHANGE UP (ref 3.8–5.2)
RBC # FLD: 15.1 % — HIGH (ref 10.3–14.5)
SAO2 % BLDV: 52.4 % — SIGNIFICANT CHANGE UP
SARS-COV-2 RNA SPEC QL NAA+PROBE: SIGNIFICANT CHANGE UP
SODIUM SERPL-SCNC: 139 MMOL/L — SIGNIFICANT CHANGE UP (ref 135–145)
WBC # BLD: 4.86 K/UL — SIGNIFICANT CHANGE UP (ref 3.8–10.5)
WBC # FLD AUTO: 4.86 K/UL — SIGNIFICANT CHANGE UP (ref 3.8–10.5)

## 2021-11-29 PROCEDURE — 99223 1ST HOSP IP/OBS HIGH 75: CPT | Mod: GC

## 2021-11-29 PROCEDURE — 99285 EMERGENCY DEPT VISIT HI MDM: CPT

## 2021-11-29 RX ORDER — METOPROLOL TARTRATE 50 MG
25 TABLET ORAL AT BEDTIME
Refills: 0 | Status: DISCONTINUED | OUTPATIENT
Start: 2021-11-29 | End: 2021-11-30

## 2021-11-29 RX ORDER — ASPIRIN/CAFFEINE 400MG-32MG
2 TABLET ORAL
Qty: 0 | Refills: 0 | DISCHARGE

## 2021-11-29 RX ORDER — ONDANSETRON 8 MG/1
4 TABLET, FILM COATED ORAL EVERY 8 HOURS
Refills: 0 | Status: DISCONTINUED | OUTPATIENT
Start: 2021-11-29 | End: 2021-11-30

## 2021-11-29 RX ORDER — SODIUM CHLORIDE 9 MG/ML
1000 INJECTION INTRAMUSCULAR; INTRAVENOUS; SUBCUTANEOUS ONCE
Refills: 0 | Status: COMPLETED | OUTPATIENT
Start: 2021-11-29 | End: 2021-11-29

## 2021-11-29 RX ORDER — FUROSEMIDE 40 MG
20 TABLET ORAL ONCE
Refills: 0 | Status: COMPLETED | OUTPATIENT
Start: 2021-11-29 | End: 2021-11-29

## 2021-11-29 RX ORDER — FLUTICASONE PROPIONATE 50 MCG
1 SPRAY, SUSPENSION NASAL
Qty: 0 | Refills: 0 | DISCHARGE

## 2021-11-29 RX ORDER — CALCITONIN SALMON 200 [IU]/ML
260 INJECTION, SOLUTION INTRAMUSCULAR ONCE
Refills: 0 | Status: DISCONTINUED | OUTPATIENT
Start: 2021-11-29 | End: 2021-11-29

## 2021-11-29 RX ORDER — POTASSIUM CHLORIDE 20 MEQ
40 PACKET (EA) ORAL ONCE
Refills: 0 | Status: COMPLETED | OUTPATIENT
Start: 2021-11-29 | End: 2021-11-29

## 2021-11-29 RX ORDER — TELMISARTAN AND HYDROCHLOROTHIAZIDE 40; 12.5 MG/1; MG/1
1 TABLET ORAL
Qty: 0 | Refills: 0 | DISCHARGE

## 2021-11-29 RX ORDER — POTASSIUM CHLORIDE 20 MEQ
20 PACKET (EA) ORAL ONCE
Refills: 0 | Status: DISCONTINUED | OUTPATIENT
Start: 2021-11-29 | End: 2021-11-29

## 2021-11-29 RX ORDER — CETIRIZINE HYDROCHLORIDE 10 MG/1
1 TABLET ORAL
Qty: 0 | Refills: 0 | DISCHARGE

## 2021-11-29 RX ORDER — L.ACIDOPH/B.ANIMALIS/B.LONGUM 15B CELL
1 CAPSULE ORAL
Qty: 0 | Refills: 0 | DISCHARGE

## 2021-11-29 RX ORDER — CHOLECALCIFEROL (VITAMIN D3) 125 MCG
1 CAPSULE ORAL
Qty: 0 | Refills: 0 | DISCHARGE

## 2021-11-29 RX ORDER — CALCITONIN SALMON 200 [IU]/ML
260 INJECTION, SOLUTION INTRAMUSCULAR EVERY 12 HOURS
Refills: 0 | Status: DISCONTINUED | OUTPATIENT
Start: 2021-11-29 | End: 2021-11-29

## 2021-11-29 RX ORDER — SODIUM CHLORIDE 9 MG/ML
1000 INJECTION INTRAMUSCULAR; INTRAVENOUS; SUBCUTANEOUS
Refills: 0 | Status: DISCONTINUED | OUTPATIENT
Start: 2021-11-29 | End: 2021-11-30

## 2021-11-29 RX ORDER — ACETAMINOPHEN 500 MG
650 TABLET ORAL EVERY 6 HOURS
Refills: 0 | Status: DISCONTINUED | OUTPATIENT
Start: 2021-11-29 | End: 2021-11-30

## 2021-11-29 RX ORDER — PAMIDRONATE DISODIUM 9 MG/ML
60 INJECTION, SOLUTION INTRAVENOUS ONCE
Refills: 0 | Status: COMPLETED | OUTPATIENT
Start: 2021-11-29 | End: 2021-11-29

## 2021-11-29 RX ORDER — LANOLIN ALCOHOL/MO/W.PET/CERES
3 CREAM (GRAM) TOPICAL AT BEDTIME
Refills: 0 | Status: DISCONTINUED | OUTPATIENT
Start: 2021-11-29 | End: 2021-11-30

## 2021-11-29 RX ORDER — IBUPROFEN 200 MG
1 TABLET ORAL
Qty: 0 | Refills: 0 | DISCHARGE

## 2021-11-29 RX ADMIN — Medication 40 MILLIEQUIVALENT(S): at 18:01

## 2021-11-29 RX ADMIN — Medication 20 MILLIGRAM(S): at 16:46

## 2021-11-29 RX ADMIN — CALCITONIN SALMON 260 INTERNATIONAL UNIT(S): 200 INJECTION, SOLUTION INTRAMUSCULAR at 17:53

## 2021-11-29 RX ADMIN — PAMIDRONATE DISODIUM 64.17 MILLIGRAM(S): 9 INJECTION, SOLUTION INTRAVENOUS at 19:20

## 2021-11-29 RX ADMIN — Medication 25 MILLIGRAM(S): at 22:33

## 2021-11-29 RX ADMIN — SODIUM CHLORIDE 1000 MILLILITER(S): 9 INJECTION INTRAMUSCULAR; INTRAVENOUS; SUBCUTANEOUS at 14:47

## 2021-11-29 RX ADMIN — SODIUM CHLORIDE 125 MILLILITER(S): 9 INJECTION INTRAMUSCULAR; INTRAVENOUS; SUBCUTANEOUS at 16:46

## 2021-11-29 NOTE — ED PROVIDER NOTE - ATTENDING CONTRIBUTION TO CARE
I have personally performed a history and physical examination of the patient and discussed management with the resident as well as the patient.  I reviewed the resident's note and agree with the documented findings and plan of care.  I have authored and modified critical sections of the Provider Note, including but not limited to HPI, Physical Exam and MDM.    60 yo F w/ hx of HTN, currently undergoing outpt evaluation for MM w/ recurrent hypercalcemia from ~12-13, M spike on recent lab work, s/p bone marrow biopsy today, now presenting as directed by her heme/onc, Dr. Pandey ([phone]), for calcium fo 13 on outpt lab work. Pt asymptomatic and w/o complaints. Endocrine note from prior visit recommends bisphosphonate therapy. Will repeat Ca level and metabolic eval with cbc and cmp. Initiate IV hydration. Will discuss plan with endocrine as far as possible lasix and calcitonin, as well as disposition planning.

## 2021-11-29 NOTE — ED PROVIDER NOTE - PROGRESS NOTE DETAILS
PGY 1 Marcelino Olson: Spoke w/ Dr. Pandey. Calcium 14.1. Pt being worked out   PET scan   wants dose of bisphosphonate. DO Linn PGY-3: spoke to nephro regarding hyperCa. recs lasix and calcitonin along with maintenance fluids. will see patient. TBA PGY 1 Marcelino Olson: Calcium 14.1. Spoke w/ Dr. Pandey, pt's hematologist/oncologist. Pt being evaluated for MM outpt, had M spike on recent lab work in setting of ongoing hypercalcemia. Also bone marrow biopsy today, results pending. Found to be hypercalcemic today on outpt lab work and was sent to ED for management. Dr. Pandey is recommending tx w/ IV bisphosphonate. Pt will likely have to be admitted given Ca >14. Will discuss hypercalcemia management w/ nephrology.

## 2021-11-29 NOTE — ED PROVIDER NOTE - NSICDXPASTSURGICALHX_GEN_ALL_CORE_FT
PAST SURGICAL HISTORY:  ACL (anterior cruciate ligament) tear ACL reconstruction 2016 (L)    History of bilateral tubal ligation 1999

## 2021-11-29 NOTE — ED PROVIDER NOTE - OBJECTIVE STATEMENT
62 yo F w/ hx of HTN, currently undergoing outpt evaluation for MM w/ recurrent hypercalcemia from ~12-13, M spike on recent lab work, s/p bone marrow biopsy today, now presenting as directed by her heme/onc, Dr. Pandey ([phone]), for calcium fo 13 on outpt lab work. Pt asymptomatic and w/o complaints. Denies N/V, polyuria, fatigue, and palpitations. States she was sent in for alendronate infusion. Pt states she is not currently taking any medications for hypercalcemia.

## 2021-11-29 NOTE — ED PROVIDER NOTE - PHYSICAL EXAMINATION
Gen: A&Ox4   HEENT: Atraumatic. Mucous membranes moist, no scleral icterus.  CV: RRR. No significant lower extremity edema.   Resp: Respirations unlabored. CTAB, no rales, no wheezes.  GI: Abdomen non tender to palpation, soft and non-distended.   Skin/MSK: No open wounds. No ecchymosis appreciated.  Neuro: Following commands. No facial drop.   Psych: Appropriate mood, cooperative

## 2021-11-29 NOTE — ED ADULT TRIAGE NOTE - CHIEF COMPLAINT QUOTE
Pt states that she is currently being worked up for Multiple Myeloma, pt had labs drawn today and a bone marrow bx.  Pt was found to hypercalcemic. PMH HTN

## 2021-11-29 NOTE — H&P ADULT - PROBLEM SELECTOR PLAN 1
Currently being worked up for MM w/ Vikki Pradhan seen on outpatient labs, s/p bone marrow biopsy 11/29  --outpatient bloodwork:  iPTH of 11, 25(OH) vit D of 20.3, 1,25(OH) vit D of 230, PTHrP<2, no need to repeat  --start Calcitonin 4mg/kg x 1, pamidronate 60 mg infusion x1  --start IVF 125cc/hr  --BMP/Ca checks q12h  --f/u nephrology and oncology recommendations Currently being worked up for MM w/ Vikki Pradhan seen on outpatient labs, s/p bone marrow biopsy 11/29  --outpatient bloodwork:  iPTH of 11, 25(OH) vit D of 20.3, 1,25(OH) vit D of 230, PTHrP<2, no need to repeat  --start Calcitonin 4mg/kg x 1, pamidronate 60 mg infusion x1  --start IVF 125cc/hr  - f/up repeat calcium at 11/29 10 pm,  --BMP/Ca checks q12h after  - f/up biopy  --f/u nephrology and oncology recommendations Currently being worked up for MM w/ Vikki Pradhan seen on outpatient labs, s/p bone marrow biopsy 11/29  --outpatient bloodwork:  iPTH of 11, 25(OH) vit D of 20.3, 1,25(OH) vit D of 230, PTHrP<2, no need to repeat  --start Calcitonin 4mg/kg x 1, pamidronate 60 mg infusion x1  --start IVF 125cc/hr  - f/up repeat calcium at 11/29 10 pm  --BMP/Ca checks q12h after  - f/up biopsy  --f/u nephrology and oncology recommendations

## 2021-11-29 NOTE — ED PROVIDER NOTE - CLINICAL SUMMARY MEDICAL DECISION MAKING FREE TEXT BOX
60 yo F w/ hx of HTN, currently undergoing outpt evaluation for MM w/ recurrent hypercalcemia from ~12-13, M spike on recent lab work, s/p bone marrow biopsy today, now presenting as directed by her heme/onc, Dr. Pandey ([phone]), for calcium fo 13 on outpt lab work. Pt asymptomatic and w/o complaints.  States she was sent in for alendronate infusion. Pt states she is not currently taking any medications for hypercalcemia. Exam as above. ECG w/o concerning findings. Concern for hypercalcemia. Remains asymptomatic and HDS. Will check labs, IV hydration. Will discuss plan w/ Dr. Pandey.

## 2021-11-29 NOTE — H&P ADULT - NSHPPHYSICALEXAM_GEN_ALL_CORE
Vital Signs Last 24 Hrs  T(C): 36.6 (29 Nov 2021 12:50), Max: 36.6 (29 Nov 2021 12:50)  T(F): 97.8 (29 Nov 2021 12:50), Max: 97.8 (29 Nov 2021 12:50)  HR: 84 (29 Nov 2021 14:47) (84 - 98)  BP: 133/77 (29 Nov 2021 14:47) (133/77 - 179/99)  BP(mean): --  RR: 16 (29 Nov 2021 14:47) (16 - 16)  SpO2: 100% (29 Nov 2021 14:47) (99% - 100%)    CONSTITUTIONAL: Well-groomed, in no apparent distress  EYES: No conjunctival or scleral injection, non-icteric; PERRLA and symmetric  ENMT: No external nasal lesions; nasal mucosa not inflamed; normal dentition; no pharyngeal injection or exudates, oral mucosa with moist membranes  NECK: Trachea midline without palpable neck mass; thyroid not enlarged and non-tender  RESPIRATORY: Breathing comfortably; no dullness to percussion; lungs CTA without wheeze/rhonchi/rales  CARDIOVASCULAR: +S1S2, RRR, no M/G/R; no carotid bruits; pedal pulses full and symmetric; no lower extremity edema  CHEST/BREAST: Breasts are symmetric in appearance; no palpable masses or lumps  GASTROINTESTINAL: No palpable masses or tenderness, +BS throughout, no rebound/guarding; no hepatosplenomegaly; no hernia palpated on palpation; palpation of uterus and adnexa without tenderness or mass  LYMPHATIC: No cervical LAD or tenderness; no axillary LAD or tenderness; no inguinal LAD or tenderness  MUSCULOSKELETAL: Normal gait and station; no digital clubbing or cyanosis; no paraspinal tenderness; examination of the  (head/neck, spine/ribs/pelvis, RUE, LUE, RLE, LLE) without misalignment, normal strength and tone of extremities  SKIN: No rashes or ulcers noted; no subcutaneous nodules or induration palpable  NEUROLOGIC: CN II-XII intact; normal reflexes in upper and lower extremities; sensation intact in LEs b/l to light touch  PSYCHIATRIC: A+O x 3; mood and affect appropriate; appropriate insight and judgment

## 2021-11-29 NOTE — H&P ADULT - CLICK TO LAUNCH ORM
Med refill request: One touch Delica 30G Lancets     Last seen: 11/25/19  Next appointment: 5/26/21    Per PCP's progress note on 11/25/2019: \"He checks blood sugars at home 1 times per day and the average is 110s to 120s but higher when he gets steroid injections.\"    Order sent.               .

## 2021-11-29 NOTE — H&P ADULT - HISTORY OF PRESENT ILLNESS
62 yo F w/ hx of HTN, currently undergoing outpt evaluation for MM w/ recurrent hypercalcemia from ~12-13, M spike on recent lab work, s/p bone marrow biopsy today, now presenting as directed by her heme/onc, Dr. Pandey ([phone]), for calcium fo 13 on outpt lab work. Pt asymptomatic and w/o complaints. Denies N/V, polyuria, fatigue, and palpitations. States she was sent in for alendronate infusion. Pt states she is not currently taking any medications for hypercalcemia.    HPI: 61 year old F with hx of HTN, obesity, and is presenting with outpatient labs concerning for hypercalcememia. She is currently being worked up for  MM w/ her outpt heme/oncologis Dr. Pandey, and recieved a bone marrow biopsy today.     In the ED:  Vitals stable, calcium level 14.4     Outpt bloodwork   11/3/21:  iPTH of 11, 25(OH) vit D of 20.3, 1,25(OH) vit D of 230, PTHrP<2, kappa level  41.3 and lambda of 29.3  11/24/21: Serum Linda showed IgG Lambda elevation with B2-microglobin level of 7.80     Pt. denies any complaints for now. pt reported of polyuria. Denies SOB, CP, HA, N/V, abdominal pain, constipation or diarrhea or dizziness.    62 yo F w/ hx of HTN, currently undergoing outpt evaluation for MM w/ recurrent hypercalcemia from ~12-13, M spike on recent lab work, s/p bone marrow biopsy today, now presenting as directed by her heme/onc, Dr. Pandey ([phone]), for calcium fo 13 on outpt lab work. Pt asymptomatic and w/o complaints. Denies N/V, numbness, tingling, kidney stones, polyuria, fatigue, and palpitations. States she was sent in for alendronate infusion. Pt states she is not currently taking any medications for hypercalcemia.    In the ED:  /110 -> 133/77. calcium level 14.4. lasix 20 IV given    Outpt bloodwork   11/3/21:  iPTH of 11, 25(OH) vit D of 20.3, 1,25(OH) vit D of 230, PTHrP<2, kappa level  41.3 and lambda of 29.3  11/24/21: Serum Linda showed IgG Lambda elevation with B2-microglobin level of 7.80 62 yo F w/ hx of HTN, currently undergoing outpt evaluation for MM w/ recurrent hypercalcemia from ~12-13, M spike on recent lab work, s/p bone marrow biopsy today, now presenting as directed by her heme/onc, Dr. Pandey ([phone]), for calcium of13 on outpt lab work. Pt asymptomatic and w/o complaints. Denies N/V, numbness, tingling, kidney stones, polyuria, fatigue, and palpitations. States she was sent in for alendronate infusion. Pt states she is not currently taking any medications for hypercalcemia.    In the ED:  /110 -> 133/77. calcium level 14.4. lasix 20 IV given    Outpt bloodwork   11/3/21:  iPTH of 11, 25(OH) vit D of 20.3, 1,25(OH) vit D of 230, PTHrP<2, kappa level  41.3 and lambda of 29.3  11/24/21: Serum Linda showed IgG Lambda elevation with B2-microglobin level of 7.80

## 2021-11-29 NOTE — H&P ADULT - ASSESSMENT
62 yo F w/ hx of HTN, currently undergoing outpt evaluation for MM (s/p BM biopsy 11/29) presents sent to ED by oncologist for asymptomatic hypercalcemia of 14 seen on outpatient labs 60 yo F w/ hx of HTN, currently undergoing outpt evaluation for MM (s/p BM biopsy 11/29) presents sent to ED by oncologist for asymptomatic hypercalcemia of 14 seen on outpatient labs

## 2021-11-29 NOTE — CONSULT NOTE ADULT - SUBJECTIVE AND OBJECTIVE BOX
[facility] DIVISION OF KIDNEY DISEASES AND HYPERTENSION -- [phone]  -- INITIAL CONSULT NOTE  --------------------------------------------------------------------------------  HPI: 61 year old F with  hx of HTN, currently undergoing outpt evaluation for MM for chronic recurrent hypercalcemia presents to [facility] on 11/29/21 after being sent from outpatient hem-onc office for abnormal labs. As per pt, she went to her Hem-oncologist for Bone marrow Biopsy today and was sent after the biopsy as her blood work showed hypercalcemia. Initial labs done in ER showed hypercalcemia of 14.4. Nephrology consultation was requested for hypercalcemia management.   On admission, Serum Ca was elevated to 14.4 today. On review of [facility], it was found that Serum Ca was 14. 2 on 11/20/21. Pt stated that she was told in June 2021 after regular blood work that she has hypercalcemia and was referred to an endocrinologist. Her outpatient blood work showed iPTH of 11, 25(OH) vit D of 20.3, 1,25(OH) vit D of 230, PTHrP<2 done on 11/3/21. Serum Linda showed IgG Lambda elevation with B2-microglobin level of 7.80 done on 11/24/21. her kappa leel was at 41.3 and lambda of 29.3 on 11/3/21. Pt. underwent Bon marrow biopsy today for suspicion of MM. Pt. also endorsed that she was told to drink more fluids as it will help in high calcium levels. Pt. seen and examined in ER. Pt. denies any complaints for now. pt reported of polyuria. Denies SOB, CP, HA, N/V, abdominal pain, constipation or diarrhea or dizziness.     PAST HISTORY  --------------------------------------------------------------------------------  PAST MEDICAL & SURGICAL HISTORY:  Hypertension    Obesity    Acid reflux    Fibroid, uterine    ACL (anterior cruciate ligament) tear  ACL reconstruction 2016 (L)    History of bilateral tubal ligation  1999      FAMILY HISTORY:  Family history of hypertension (Mother)    PAST SOCIAL HISTORY:    ALLERGIES & MEDICATIONS  --------------------------------------------------------------------------------  Allergies    No Known Drug Allergies  seasonal allergies: sinus congestion, itchy eyes (Rhinitis)    Intolerances    Standing Inpatient Medications  calcitonin Injectable 260 International Unit(s) IntraMuscular every 12 hours  sodium chloride 0.9%. 1000 milliLiter(s) IV Continuous <Continuous>    PRN Inpatient Medications    REVIEW OF SYSTEMS  --------------------------------------------------------------------------------  Gen: No fevers/chills  Skin: No rashes  Head/Eyes/Ears: Normal hearing,   Respiratory: No dyspnea, cough  CV: No chest pain  GI: No abdominal pain, diarrhea  : No dysuria, hematuria, polyuria+  MSK: No  edema  Heme: No easy bruising or bleeding  Psych: No significant depression    All other systems were reviewed and are negative, except as noted.    VITALS/PHYSICAL EXAM  --------------------------------------------------------------------------------  T(C): 36.6 (11-29-21 @ 12:50), Max: 36.6 (11-29-21 @ 12:50)  HR: 84 (11-29-21 @ 14:47) (84 - 98)  BP: 133/77 (11-29-21 @ 14:47) (133/77 - 179/99)  RR: 16 (11-29-21 @ 14:47) (16 - 16)  SpO2: 100% (11-29-21 @ 14:47) (99% - 100%)  Wt(kg): --  Height (cm): 163.2 (11-29-21 @ 12:50)  Weight (kg): 65 (11-29-21 @ 15:30)  BMI (kg/m2): 24.4 (11-29-21 @ 15:30)  BSA (m2): 1.7 (11-29-21 @ 15:30)    Physical Exam:  	Gen: NAD, appears calm  	HEENT: MMM  	Pulm: CTA B/L, no crackles  	CV: S1S2  	Abd: Soft, +BS   	Ext: No LE edema B/L  	Neuro: Awake  	Skin: Warm and dry  	Vascular access:    LABS/STUDIES  --------------------------------------------------------------------------------              11.5   4.86  >-----------<  266      [11-29-21 @ 14:28]              36.7     139  |  102  |  27  ----------------------------<  119      [11-29-21 @ 14:28]  3.3   |  27  |  1.40        Ca     14.4     [11-29-21 @ 14:28]      iCa    1.86     [11-29 @ 14:28]      Mg     2.00     [11-29-21 @ 14:28]    TPro  7.7  /  Alb  4.1  /  TBili  0.2  /  DBili  x   /  AST  20  /  ALT  21  /  AlkPhos  94  [11-29-21 @ 14:28]    Creatinine Trend:  SCr 1.40 [11-29 @ 14:28]  SCr 1.55 [11-20 @ 15:56]  SCr 1.61 [11-20 @ 12:06]

## 2021-11-29 NOTE — CONSULT NOTE ADULT - PROBLEM SELECTOR RECOMMENDATION 3
Pt. with hypokalemia. Serum potassium is at 3.3 today. Recommend medical management. Monitor Serum potassium daily.     If you have any questions, please feel free to contact me  Alexandru Martinez  Nephrology Fellow  [phone]  (After 5pm or on weekends please page the on-call fellow).

## 2021-11-29 NOTE — CONSULT NOTE ADULT - PROBLEM SELECTOR RECOMMENDATION 2
Pt. with CKD. On admission , Scr is 1.40 today. On review of [facility], it was found that Scr was 1.61 on 11/20/21. Pt. likely has CKD stage3. Monitor labs and urine output. Avoid any potential nephrotoxins. Dose medications as per eGFR.     If you have any questions, please feel free to contact me  Alexandru Martinez  Nephrology Fellow  [phone]  (After 5pm or on weekends please page the on-call fellow). Pt. with CKD. On admission , Scr is 1.40 today. On review of [facility], it was found that Scr was 1.61 on 11/20/21. Pt. likely has CKD stage3. Monitor labs and urine output. Avoid any potential nephrotoxins. Dose medications as per eGFR.

## 2021-11-29 NOTE — ED PROVIDER NOTE - NS ED ROS FT
Gen: Denies fever.   HEENT: Denies headache. Denies congestion.  CV: Denies chest pain. Denies lightheadedness.  Skin: Denies rash.   Resp: Denies SOB. Denies cough.  GI: Denies abd pain. Denies nausea. Denies vomiting. Denies diarrhea. Denies melena. Denies hematochezia.  Msk: Denies extremity swelling. Denies extremity pain.  : Denies dysuria. Denies hematuria.  Neuro: Denies LOC. Denies dizziness. Denies new numbness/tingling. Denies new focal weakness.  Psych: Denies SI

## 2021-11-29 NOTE — ED CLERICAL - NS ED CLERK NOTE PRE-ARRIVAL INFORMATION; ADDITIONAL PRE-ARRIVAL INFORMATION
pt sent in for high calcium 13.4 pt needs hydration  and medication pt has MGUS and is being w/u for multiple myeloma

## 2021-11-29 NOTE — H&P ADULT - NSICDXPASTSURGICALHX_GEN_ALL_CORE_FT
PAST SURGICAL HISTORY:  ACL (anterior cruciate ligament) tear ACL reconstruction 2016 (L)    History of bilateral tubal ligation 1999    Uterine fibroid

## 2021-11-29 NOTE — ED ADULT NURSE REASSESSMENT NOTE - NS ED NURSE REASSESS COMMENT FT1
Pt received from day RN: A&Ox4, respirations are even and unlabored, sating at 100% on RA, NSR on cardiac monitor. Pt denies CP, SOB, headache, dizziness, and palpitations. Assessment ongoing.

## 2021-11-29 NOTE — H&P ADULT - NSHPREVIEWOFSYSTEMS_GEN_ALL_CORE
CONSTITUTIONAL:  No weight loss, fever, chills, weakness or fatigue.  HEENT:  Eyes:  No visual loss, blurred vision, double vision or yellow sclerae. Ears, Nose, Throat:  No hearing loss, sneezing, congestion, runny nose or sore throat.  SKIN:  No rash or itching.  CARDIOVASCULAR:  No chest pain, chest pressure or chest discomfort. No palpitations.  RESPIRATORY:  No shortness of breath, cough or sputum.  GASTROINTESTINAL:  No anorexia, nausea, vomiting or diarrhea. No abdominal pain or blood.  GENITOURINARY:  Denies hematuria, dysuria.   NEUROLOGICAL:  No headache, dizziness, syncope, paralysis, ataxia, numbness or tingling in the extremities. No change in bowel or bladder control.  MUSCULOSKELETAL:  No muscle, back pain, joint pain or stiffness.  HEMATOLOGIC:  No anemia, bleeding or bruising.  LYMPHATICS:  No enlarged nodes.   PSYCHIATRIC:  No history of depression or anxiety.  ENDOCRINOLOGIC:  No reports of sweating, cold or heat intolerance. No polyuria or polydipsia.  ALLERGIES:  No history of asthma, hives, eczema or rhinitis.

## 2021-11-29 NOTE — H&P ADULT - NSHPLABSRESULTS_GEN_ALL_CORE
Labs:                        11.5   4.86  )-----------( 266      ( 29 Nov 2021 14:28 )             36.7     Auto Eosinophil # 0.19  / Auto Eosinophil % 3.9   / Auto Neutrophil # 3.11  / Auto Neutrophil % 64.0  / BANDS % x        Hgb Trend: 11.5<--    11-29    139  |  102  |  27<H>  ----------------------------<  119<H>  3.3<L>   |  27  |  1.40<H>    Ca    14.4<HH>      29 Nov 2021 14:28  Mg     2.00     11-29  TPro  7.7  /  Alb  4.1  /  TBili  0.2  /  DBili  x   /  AST  20  /  ALT  21  /  AlkPhos  94  11-29    Creatinine Trend: 1.40<--, 1.55<--, 1.61<--            ABG:   VENT:       Labs result reviewed by me. Labs:                        11.5   4.86  )-----------( 266      ( 29 Nov 2021 14:28 )             36.7     Auto Eosinophil # 0.19  / Auto Eosinophil % 3.9   / Auto Neutrophil # 3.11  / Auto Neutrophil % 64.0  / BANDS % x        Hgb Trend: 11.5<--    11-29    139  |  102  |  27<H>  ----------------------------<  119<H>  3.3<L>   |  27  |  1.40<H>    Ca    14.4<HH>      29 Nov 2021 14:28  Mg     2.00     11-29  TPro  7.7  /  Alb  4.1  /  TBili  0.2  /  DBili  x   /  AST  20  /  ALT  21  /  AlkPhos  94  11-29    Creatinine Trend: 1.40<--, 1.55<--, 1.61<--      Labs result reviewed by me.

## 2021-11-29 NOTE — CONSULT NOTE ADULT - ATTENDING COMMENTS
admitted with worsening hypercalcemia and TOOTIE. outpatient workup yielded High Vitamin 1,25 levels. s/p BM biopsy yetreday  s/p IVF, Calcitonin, Pamidronate  Cr trends ar down and so is Ca trends    will monitor trends while here

## 2021-11-29 NOTE — CONSULT NOTE ADULT - PROBLEM SELECTOR RECOMMENDATION 9
Pt. with hypercalcemia. Etiology unclear for now. Work up was done as outpatient for suspicion of MM. On admission, Serum Ca was elevated to 14.4 today. Currently receiving IVf. Recommend continuing IVF for now. Recommend calcitonin 4 unit/kg once today. Recommend giving pamidronate 60 mg over 1 hr. Repeat Serum Calcium in 4 hrs. Monitor serum Ca q12 for now.

## 2021-11-29 NOTE — H&P ADULT - ATTENDING COMMENTS
Patient was seen and examined on 11/29 at 6PM.    62 yo F w/ hx of HTN, currently undergoing outpt evaluation for MM (s/p BM biopsy 11/29) sent to ED by oncologist for asymptomatic hypercalcemia of 14 seen on outpatient labs. Here patient feels well. Has no complaints. Vitals stable. Seen by renal in ED.     #Hypercalcemia - asymptomatic at this time. iPTH low and PTHrp also low on outpt labs. S/p bone marrow bx as outpt today. Started on IVF. S/p calcitonin. S/p pamidronate 11/29. Will need outpt follow up for workup of hypercalcemia.    #HTN- c/w lopressor     #CKD3 - monitor renal function    Plan discussed with patient and HS1

## 2021-11-29 NOTE — ED ADULT NURSE NOTE - OBJECTIVE STATEMENT
62 y/o female, a&ox4, NAD, received to Tra[facility] for abnormal lab values. Pt reports labs drawn earlier this morning and physician notified her about elevated calcium levels. Pt reports pmh of HTN, and is currently undergoing testing to rule out multiple myeloma. Pt denies CP, SOB, nausea, vomiting, diarrhea, dizziness, and abdominal pain. 18G IV placed to left AC. Respirations are even and unlabored, no signs of respiratory distress. Labs collected and sent off.

## 2021-11-30 ENCOUNTER — TRANSCRIPTION ENCOUNTER (OUTPATIENT)
Age: 61
End: 2021-11-30

## 2021-11-30 VITALS
TEMPERATURE: 98 F | RESPIRATION RATE: 18 BRPM | OXYGEN SATURATION: 99 % | DIASTOLIC BLOOD PRESSURE: 77 MMHG | SYSTOLIC BLOOD PRESSURE: 155 MMHG | HEART RATE: 76 BPM

## 2021-11-30 LAB
ANION GAP SERPL CALC-SCNC: 13 MMOL/L — SIGNIFICANT CHANGE UP (ref 7–14)
BUN SERPL-MCNC: 25 MG/DL — HIGH (ref 7–23)
CALCIUM SERPL-MCNC: 11.4 MG/DL — HIGH (ref 8.4–10.5)
CALCIUM SERPL-MCNC: 11.4 MG/DL — HIGH (ref 8.4–10.5)
CHLORIDE SERPL-SCNC: 103 MMOL/L — SIGNIFICANT CHANGE UP (ref 98–107)
CO2 SERPL-SCNC: 22 MMOL/L — SIGNIFICANT CHANGE UP (ref 22–31)
CREAT SERPL-MCNC: 1.2 MG/DL — SIGNIFICANT CHANGE UP (ref 0.5–1.3)
GLUCOSE SERPL-MCNC: 124 MG/DL — HIGH (ref 70–99)
HCT VFR BLD CALC: 33.6 % — LOW (ref 34.5–45)
HGB BLD-MCNC: 10.9 G/DL — LOW (ref 11.5–15.5)
MAGNESIUM SERPL-MCNC: 1.9 MG/DL — SIGNIFICANT CHANGE UP (ref 1.6–2.6)
MCHC RBC-ENTMCNC: 25.6 PG — LOW (ref 27–34)
MCHC RBC-ENTMCNC: 32.4 GM/DL — SIGNIFICANT CHANGE UP (ref 32–36)
MCV RBC AUTO: 79.1 FL — LOW (ref 80–100)
NRBC # BLD: 0 /100 WBCS — SIGNIFICANT CHANGE UP
NRBC # FLD: 0 K/UL — SIGNIFICANT CHANGE UP
PHOSPHATE SERPL-MCNC: 2.8 MG/DL — SIGNIFICANT CHANGE UP (ref 2.5–4.5)
PLATELET # BLD AUTO: 260 K/UL — SIGNIFICANT CHANGE UP (ref 150–400)
POTASSIUM SERPL-MCNC: 4.3 MMOL/L — SIGNIFICANT CHANGE UP (ref 3.5–5.3)
POTASSIUM SERPL-SCNC: 4.3 MMOL/L — SIGNIFICANT CHANGE UP (ref 3.5–5.3)
RBC # BLD: 4.25 M/UL — SIGNIFICANT CHANGE UP (ref 3.8–5.2)
RBC # FLD: 15.3 % — HIGH (ref 10.3–14.5)
SODIUM SERPL-SCNC: 138 MMOL/L — SIGNIFICANT CHANGE UP (ref 135–145)
WBC # BLD: 5.57 K/UL — SIGNIFICANT CHANGE UP (ref 3.8–10.5)
WBC # FLD AUTO: 5.57 K/UL — SIGNIFICANT CHANGE UP (ref 3.8–10.5)

## 2021-11-30 PROCEDURE — 99239 HOSP IP/OBS DSCHRG MGMT >30: CPT | Mod: GC

## 2021-11-30 PROCEDURE — 99254 IP/OBS CNSLTJ NEW/EST MOD 60: CPT | Mod: GC

## 2021-11-30 RX ORDER — MAGNESIUM SULFATE 500 MG/ML
1 VIAL (ML) INJECTION ONCE
Refills: 0 | Status: COMPLETED | OUTPATIENT
Start: 2021-11-30 | End: 2021-11-30

## 2021-11-30 RX ADMIN — SODIUM CHLORIDE 125 MILLILITER(S): 9 INJECTION INTRAMUSCULAR; INTRAVENOUS; SUBCUTANEOUS at 10:00

## 2021-11-30 RX ADMIN — Medication 100 GRAM(S): at 09:57

## 2021-11-30 NOTE — DISCHARGE NOTE PROVIDER - PROVIDER TOKENS
FREE:[LAST:[Dr. Pandey, Oncologist],PHONE:[phone]],FAX:[(   )    -],ADDRESS:[[facility]],FOLLOWUP:[2 weeks]]

## 2021-11-30 NOTE — PROGRESS NOTE ADULT - PROBLEM SELECTOR PLAN 3
baseline Cr ~1.3. Possibly 2/2 MM  - f/u nephrology recommendations baseline Cr ~1.3. Possibly 2/2 MM  - improved to 1.2 11/30 from 1.6   - f/u nephrology recommendations

## 2021-11-30 NOTE — PATIENT PROFILE ADULT - FALL HARM RISK - UNIVERSAL INTERVENTIONS
home Bed in lowest position, wheels locked, appropriate side rails in place/Call bell, personal items and telephone in reach/Instruct patient to call for assistance before getting out of bed or chair/Non-slip footwear when patient is out of bed/[place] to call system/Physically safe environment - no spills, clutter or unnecessary equipment/Purposeful Proactive Rounding/Room/bathroom lighting operational, light cord in reach

## 2021-11-30 NOTE — PROGRESS NOTE ADULT - SUBJECTIVE AND OBJECTIVE BOX
Patient is a 61y old  Female who presents with a chief complaint of     SUBJECTIVE / OVERNIGHT EVENTS: Patient seen and examined at bedside.    MEDICATIONS  (STANDING):  metoprolol succinate ER 25 milliGRAM(s) Oral at bedtime  sodium chloride 0.9%. 1000 milliLiter(s) (125 mL/Hr) IV Continuous <Continuous>    MEDICATIONS  (PRN):  acetaminophen     Tablet .. 650 milliGRAM(s) Oral every 6 hours PRN Temp greater or equal to 38C (100.4F), Mild Pain (1 - 3)  melatonin 3 milliGRAM(s) Oral at bedtime PRN Insomnia  ondansetron Injectable 4 milliGRAM(s) IV Push every 8 hours PRN Nausea and/or Vomiting      Vital Signs Last 24 Hrs  T(C): 36.8 (30 Nov 2021 06:10), Max: 36.8 (30 Nov 2021 06:10)  T(F): 98.2 (30 Nov 2021 06:10), Max: 98.2 (30 Nov 2021 06:10)  HR: 65 (30 Nov 2021 06:10) (65 - 98)  BP: 130/88 (30 Nov 2021 06:10) (122/68 - 179/99)  BP(mean): --  RR: 18 (30 Nov 2021 06:10) (16 - 22)  SpO2: 99% (30 Nov 2021 06:10) (99% - 100%)  CAPILLARY BLOOD GLUCOSE        I&O's Summary      PHYSICAL EXAM:      LABS:                        10.9   5.57  )-----------( 260      ( 30 Nov 2021 04:36 )             33.6     11-30    138  |  103  |  25<H>  ----------------------------<  124<H>  4.3   |  22  |  1.20    Ca    11.4<H>      30 Nov 2021 04:36  Phos  2.8     11-30  Mg     1.90     11-30    TPro  7.7  /  Alb  4.1  /  TBili  0.2  /  DBili  x   /  AST  20  /  ALT  21  /  AlkPhos  94  11-29              RADIOLOGY & ADDITIONAL TESTS:    Imaging Personally Reviewed:    Consultant(s) Notes Reviewed:      Care Discussed with Consultants/Other Providers:    Mila Barger, PGY-1; [facility] Pager: [phone]; [facility] Pager: [phone]   Patient is a 61y old  Female who presents with a chief complaint of hypercalcemia    SUBJECTIVE / OVERNIGHT EVENTS: Patient seen and examined at bedside. No acute complaints o/n    MEDICATIONS  (STANDING):  metoprolol succinate ER 25 milliGRAM(s) Oral at bedtime  sodium chloride 0.9%. 1000 milliLiter(s) (125 mL/Hr) IV Continuous <Continuous>    MEDICATIONS  (PRN):  acetaminophen     Tablet .. 650 milliGRAM(s) Oral every 6 hours PRN Temp greater or equal to 38C (100.4F), Mild Pain (1 - 3)  melatonin 3 milliGRAM(s) Oral at bedtime PRN Insomnia  ondansetron Injectable 4 milliGRAM(s) IV Push every 8 hours PRN Nausea and/or Vomiting      Vital Signs Last 24 Hrs  T(C): 36.8 (30 Nov 2021 06:10), Max: 36.8 (30 Nov 2021 06:10)  T(F): 98.2 (30 Nov 2021 06:10), Max: 98.2 (30 Nov 2021 06:10)  HR: 65 (30 Nov 2021 06:10) (65 - 98)  BP: 130/88 (30 Nov 2021 06:10) (122/68 - 179/99)  BP(mean): --  RR: 18 (30 Nov 2021 06:10) (16 - 22)  SpO2: 99% (30 Nov 2021 06:10) (99% - 100%)  CAPILLARY BLOOD GLUCOSE        I&O's Summary      PHYSICAL EXAM:  CONSTITUTIONAL: Well-groomed, in no apparent distress  EYES: No conjunctival or scleral injection, non-icteric; PERRLA and symmetric  ENMT: No external nasal lesions; nasal mucosa not inflamed; normal dentition; no pharyngeal injection or exudates, oral mucosa with moist membranes  NECK: Trachea midline without palpable neck mass; thyroid not enlarged and non-tender  RESPIRATORY: Breathing comfortably; no dullness to percussion; lungs CTA without wheeze/rhonchi/rales  CARDIOVASCULAR: +S1S2, RRR, no M/G/R; no carotid bruits; pedal pulses full and symmetric; no lower extremity edema  CHEST/BREAST: Breasts are symmetric in appearance; no palpable masses or lumps  GASTROINTESTINAL: No palpable masses or tenderness, +BS throughout, no rebound/guarding; no hepatosplenomegaly; no hernia palpated on palpation; palpation of uterus and adnexa without tenderness or mass  LYMPHATIC: No cervical LAD or tenderness; no axillary LAD or tenderness; no inguinal LAD or tenderness  MUSCULOSKELETAL: Normal gait and station; no digital clubbing or cyanosis; no paraspinal tenderness; examination of the  (head/neck, spine/ribs/pelvis, RUE, LUE, RLE, LLE) without misalignment, normal strength and tone of extremities  SKIN: No rashes or ulcers noted; no subcutaneous nodules or induration palpable  NEUROLOGIC: CN II-XII intact; normal reflexes in upper and lower extremities; sensation intact in LEs b/l to light touch  PSYCHIATRIC: A+O x 3; mood and affect appropriate; appropriate insight and judgment    LABS:                        10.9   5.57  )-----------( 260      ( 30 Nov 2021 04:36 )             33.6     11-30    138  |  103  |  25<H>  ----------------------------<  124<H>  4.3   |  22  |  1.20    Ca    11.4<H>      30 Nov 2021 04:36  Phos  2.8     11-30  Mg     1.90     11-30    TPro  7.7  /  Alb  4.1  /  TBili  0.2  /  DBili  x   /  AST  20  /  ALT  21  /  AlkPhos  94  11-29              RADIOLOGY & ADDITIONAL TESTS:    Imaging Personally Reviewed:    Consultant(s) Notes Reviewed:      Care Discussed with Consultants/Other Providers:    Mila Barger, PGY-1; [facility] Pager: [phone]; [facility] Pager: [phone]

## 2021-11-30 NOTE — DISCHARGE NOTE NURSING/CASE MANAGEMENT/SOCIAL WORK - PATIENT PORTAL LINK FT
You can access the FollowMyHealth Patient Portal offered by [facility] by registering at the following website: [email or web address]. By joining [employer]’s FollowMyHealth portal, you will also be able to view your health information using other applications (apps) compatible with our system.

## 2021-11-30 NOTE — DISCHARGE NOTE PROVIDER - NSDCCPCAREPLAN_GEN_ALL_CORE_FT
PRINCIPAL DISCHARGE DIAGNOSIS  Diagnosis: Hypercalcemia  Assessment and Plan of Treatment: While you were here you were treated with calcitonin, pamidronate, and fluids. Your calcium level came down appropriately to 11.4. Please follow up with your oncogist, Dr. Pandey, for your hypercalcemia and bone marrow biopsy results

## 2021-11-30 NOTE — DISCHARGE NOTE NURSING/CASE MANAGEMENT/SOCIAL WORK - NSDCPEFALRISK_GEN_ALL_CORE
For information on Fall & Injury Prevention, visit: [email or web address] OR  [email or web address] OR  https://www.cdc.gov/steadi/patient.html

## 2021-11-30 NOTE — DISCHARGE NOTE PROVIDER - CARE PROVIDER_API CALL
Dr. Pandey, Oncologist,   [facility]  Phone: [phone]  Fax: (   )    -  Follow Up Time: 2 weeks

## 2021-11-30 NOTE — DISCHARGE NOTE PROVIDER - HOSPITAL COURSE
62 yo F w/ hx of HTN, currently undergoing outpt evaluation for MM w/ recurrent hypercalcemia from ~12-13, M spike on recent lab work, s/p bone marrow biopsy 11/29, now presenting as directed by her heme/onc, Dr. Pandey ([phone]), for calcium of13 on outpt lab work. Pt asymptomatic and w/o complaints. Denies N/V, numbness, tingling, kidney stones, polyuria, fatigue, and palpitations. States she was sent in for alendronate infusion. Pt states she is not currently taking any medications for hypercalcemia.    Outpt bloodwork   11/3/21:  iPTH of 11, 25(OH) vit D of 20.3, 1,25(OH) vit D of 230, PTHrP<2, kappa level  41.3 and lambda of 29.3  11/24/21: Serum Linda showed IgG Lambda elevation with B2-microglobin level of 7.80    Hospital Course:  In the ED:  /110 -> 133/77. calcium level 14.4. lasix 20 IV given    For her hypercalcemia, she was given calcitonin 4mg/kg x1, pamidronate 60 mg x1, and was started on IVF. Her calcium resolved to 11.4. For her HTN we resumed her home dose of metoprolol 25 was continued. Her creatinine came down from 1.6 to 1.2 during her stay.     The patient will follow up on her BM biopsy outpatient and continue to follow with her oncologist. The patient is currently medically stable and cleared for discharge.

## 2021-11-30 NOTE — ED ADULT NURSE REASSESSMENT NOTE - NS ED NURSE REASSESS COMMENT FT1
Pt awake and alert, respirations are even and unlabored, vitals per flow sheet, and in no acute distress at this time.

## 2021-11-30 NOTE — ED ADULT NURSE REASSESSMENT NOTE - NS ED NURSE REASSESS COMMENT FT1
Pt awake and alert, respirations are even and unlabored, sating at 100% on RA, NSR on cardiac monitor. Pt ambulates with stand by assist with no dizziness. Pt in no acute distress at this time.

## 2021-11-30 NOTE — PROGRESS NOTE ADULT - ASSESSMENT
60 yo F w/ hx of HTN, currently undergoing outpt evaluation for MM (s/p BM biopsy 11/29) presents sent to ED by oncologist for asymptomatic hypercalcemia of 14 seen on outpatient labs

## 2021-11-30 NOTE — PROGRESS NOTE ADULT - ATTENDING COMMENTS
60 yo F w/ hx of HTN, currently undergoing outpt evaluation for MM (s/p BM biopsy 11/29) sent to ED by oncologist for asymptomatic hypercalcemia of 14 seen on outpatient labs. Here patient feels well. Has no complaints. Vitals stable. Seen by renal in ED.     #Hypercalcemia - asymptomatic at this time. iPTH low and PTHrp also low on outpt labs. S/p bone marrow bx as outpt on 11/29. Started on IVF. S/p calcitonin. S/p pamidronate on 11/29. Ca improved today. Remains asymptomatic. Cleared for DC per renal. Patient to f/u with Dr. Pandey for bone marrow biopsy results and repeat blood work for monitoring    #HTN- c/w lopressor     #CKD3 - renal function improved today    Plan discussed with patient and HS1. DC home today. Time spent 40 mins

## 2021-11-30 NOTE — PROGRESS NOTE ADULT - PROBLEM SELECTOR PLAN 1
Currently being worked up for MM w/ JUNI Pradhan-carol seen on outpatient labs, s/p bone marrow biopsy 11/29  --outpatient bloodwork:  iPTH of 11, 25(OH) vit D of 20.3, 1,25(OH) vit D of 230, PTHrP<2, no need to repeat  --start Calcitonin 4mg/kg x 1, pamidronate 60 mg infusion x1  --start IVF 125cc/hr  - f/up repeat calcium at 11/30 10 11.4  --BMP/Ca checks q12h after  - f/up biopsy  --f/u nephrology and oncology recommendations Currently being worked up for MM w/ JUNI Pradhan-carol seen on outpatient labs, s/p bone marrow biopsy 11/29  --outpatient bloodwork:  iPTH of 11, 25(OH) vit D of 20.3, 1,25(OH) vit D of 230, PTHrP<2, no need to repeat  --start Calcitonin 4mg/kg x 1, pamidronate 60 mg infusion x1  --start IVF 125cc/hr  - f/up repeat calcium at 11/30 10 11.4  --BMP/Ca checks q12h after  - f/up biopsy  --f/u nephrology recommendations

## 2021-12-01 LAB
HCV AB S/CO SERPL IA: 0.37 S/CO — SIGNIFICANT CHANGE UP (ref 0–0.99)
HCV AB SERPL-IMP: SIGNIFICANT CHANGE UP

## 2021-12-14 ENCOUNTER — APPOINTMENT (OUTPATIENT)
Dept: OBGYN | Facility: CLINIC | Age: 61
End: 2021-12-14

## 2022-06-28 ENCOUNTER — INPATIENT (INPATIENT)
Facility: HOSPITAL | Age: 62
LOS: 0 days | Discharge: HOME | End: 2022-06-29
Attending: HOSPITALIST | Admitting: HOSPITALIST
Payer: COMMERCIAL

## 2022-06-28 VITALS
HEART RATE: 88 BPM | HEIGHT: 64.25 IN | SYSTOLIC BLOOD PRESSURE: 168 MMHG | TEMPERATURE: 98 F | OXYGEN SATURATION: 100 % | RESPIRATION RATE: 16 BRPM | DIASTOLIC BLOOD PRESSURE: 88 MMHG

## 2022-06-28 DIAGNOSIS — S83.519A SPRAIN OF ANTERIOR CRUCIATE LIGAMENT OF UNSPECIFIED KNEE, INITIAL ENCOUNTER: Chronic | ICD-10-CM

## 2022-06-28 DIAGNOSIS — D25.9 LEIOMYOMA OF UTERUS, UNSPECIFIED: Chronic | ICD-10-CM

## 2022-06-28 DIAGNOSIS — Z98.51 TUBAL LIGATION STATUS: Chronic | ICD-10-CM

## 2022-06-28 DIAGNOSIS — E83.52 HYPERCALCEMIA: ICD-10-CM

## 2022-06-28 LAB
ALBUMIN SERPL ELPH-MCNC: 3.7 G/DL — SIGNIFICANT CHANGE UP (ref 3.3–5)
ALBUMIN SERPL ELPH-MCNC: 3.9 G/DL — SIGNIFICANT CHANGE UP (ref 3.3–5)
ALP SERPL-CCNC: 100 U/L — SIGNIFICANT CHANGE UP (ref 40–120)
ALP SERPL-CCNC: 102 U/L — SIGNIFICANT CHANGE UP (ref 40–120)
ALT FLD-CCNC: 20 U/L — SIGNIFICANT CHANGE UP (ref 4–33)
ALT FLD-CCNC: 22 U/L — SIGNIFICANT CHANGE UP (ref 4–33)
ANION GAP SERPL CALC-SCNC: 11 MMOL/L — SIGNIFICANT CHANGE UP (ref 7–14)
ANION GAP SERPL CALC-SCNC: 11 MMOL/L — SIGNIFICANT CHANGE UP (ref 7–14)
AST SERPL-CCNC: 23 U/L — SIGNIFICANT CHANGE UP (ref 4–32)
AST SERPL-CCNC: 24 U/L — SIGNIFICANT CHANGE UP (ref 4–32)
BASOPHILS # BLD AUTO: 0.04 K/UL — SIGNIFICANT CHANGE UP (ref 0–0.2)
BASOPHILS NFR BLD AUTO: 0.8 % — SIGNIFICANT CHANGE UP (ref 0–2)
BILIRUB SERPL-MCNC: 0.3 MG/DL — SIGNIFICANT CHANGE UP (ref 0.2–1.2)
BILIRUB SERPL-MCNC: 0.3 MG/DL — SIGNIFICANT CHANGE UP (ref 0.2–1.2)
BUN SERPL-MCNC: 31 MG/DL — HIGH (ref 7–23)
BUN SERPL-MCNC: 35 MG/DL — HIGH (ref 7–23)
CA-I BLD-SCNC: 1.85 MMOL/L — HIGH (ref 1.15–1.29)
CALCIUM SERPL-MCNC: 12.8 MG/DL — HIGH (ref 8.4–10.5)
CALCIUM SERPL-MCNC: 14.1 MG/DL — CRITICAL HIGH (ref 8.4–10.5)
CHLORIDE SERPL-SCNC: 102 MMOL/L — SIGNIFICANT CHANGE UP (ref 98–107)
CHLORIDE SERPL-SCNC: 107 MMOL/L — SIGNIFICANT CHANGE UP (ref 98–107)
CO2 SERPL-SCNC: 22 MMOL/L — SIGNIFICANT CHANGE UP (ref 22–31)
CO2 SERPL-SCNC: 25 MMOL/L — SIGNIFICANT CHANGE UP (ref 22–31)
CREAT SERPL-MCNC: 1.77 MG/DL — HIGH (ref 0.5–1.3)
CREAT SERPL-MCNC: 2.01 MG/DL — HIGH (ref 0.5–1.3)
EGFR: 28 ML/MIN/1.73M2 — LOW
EGFR: 33 ML/MIN/1.73M2 — LOW
EOSINOPHIL # BLD AUTO: 0.34 K/UL — SIGNIFICANT CHANGE UP (ref 0–0.5)
EOSINOPHIL NFR BLD AUTO: 6.6 % — HIGH (ref 0–6)
FLUAV AG NPH QL: SIGNIFICANT CHANGE UP
FLUBV AG NPH QL: SIGNIFICANT CHANGE UP
GLUCOSE SERPL-MCNC: 95 MG/DL — SIGNIFICANT CHANGE UP (ref 70–99)
GLUCOSE SERPL-MCNC: 99 MG/DL — SIGNIFICANT CHANGE UP (ref 70–99)
HCT VFR BLD CALC: 35.7 % — SIGNIFICANT CHANGE UP (ref 34.5–45)
HGB BLD-MCNC: 11 G/DL — LOW (ref 11.5–15.5)
IANC: 3.14 K/UL — SIGNIFICANT CHANGE UP (ref 1.8–7.4)
IMM GRANULOCYTES NFR BLD AUTO: 0.2 % — SIGNIFICANT CHANGE UP (ref 0–1.5)
LYMPHOCYTES # BLD AUTO: 1.08 K/UL — SIGNIFICANT CHANGE UP (ref 1–3.3)
LYMPHOCYTES # BLD AUTO: 21 % — SIGNIFICANT CHANGE UP (ref 13–44)
MAGNESIUM SERPL-MCNC: 2 MG/DL — SIGNIFICANT CHANGE UP (ref 1.6–2.6)
MCHC RBC-ENTMCNC: 25.4 PG — LOW (ref 27–34)
MCHC RBC-ENTMCNC: 30.8 GM/DL — LOW (ref 32–36)
MCV RBC AUTO: 82.4 FL — SIGNIFICANT CHANGE UP (ref 80–100)
MONOCYTES # BLD AUTO: 0.54 K/UL — SIGNIFICANT CHANGE UP (ref 0–0.9)
MONOCYTES NFR BLD AUTO: 10.5 % — SIGNIFICANT CHANGE UP (ref 2–14)
NEUTROPHILS # BLD AUTO: 3.14 K/UL — SIGNIFICANT CHANGE UP (ref 1.8–7.4)
NEUTROPHILS NFR BLD AUTO: 60.9 % — SIGNIFICANT CHANGE UP (ref 43–77)
NRBC # BLD: 0 /100 WBCS — SIGNIFICANT CHANGE UP
NRBC # FLD: 0 K/UL — SIGNIFICANT CHANGE UP
PHOSPHATE SERPL-MCNC: 3.2 MG/DL — SIGNIFICANT CHANGE UP (ref 2.5–4.5)
PLATELET # BLD AUTO: 259 K/UL — SIGNIFICANT CHANGE UP (ref 150–400)
POTASSIUM SERPL-MCNC: 3.4 MMOL/L — LOW (ref 3.5–5.3)
POTASSIUM SERPL-MCNC: 3.6 MMOL/L — SIGNIFICANT CHANGE UP (ref 3.5–5.3)
POTASSIUM SERPL-SCNC: 3.4 MMOL/L — LOW (ref 3.5–5.3)
POTASSIUM SERPL-SCNC: 3.6 MMOL/L — SIGNIFICANT CHANGE UP (ref 3.5–5.3)
PROT SERPL-MCNC: 7.4 G/DL — SIGNIFICANT CHANGE UP (ref 6–8.3)
PROT SERPL-MCNC: 7.7 G/DL — SIGNIFICANT CHANGE UP (ref 6–8.3)
PTH-INTACT FLD-MCNC: 15 PG/ML — SIGNIFICANT CHANGE UP (ref 15–65)
RBC # BLD: 4.33 M/UL — SIGNIFICANT CHANGE UP (ref 3.8–5.2)
RBC # FLD: 15.1 % — HIGH (ref 10.3–14.5)
RSV RNA NPH QL NAA+NON-PROBE: SIGNIFICANT CHANGE UP
SARS-COV-2 RNA SPEC QL NAA+PROBE: SIGNIFICANT CHANGE UP
SODIUM SERPL-SCNC: 138 MMOL/L — SIGNIFICANT CHANGE UP (ref 135–145)
SODIUM SERPL-SCNC: 140 MMOL/L — SIGNIFICANT CHANGE UP (ref 135–145)
TSH SERPL-MCNC: 1.14 UIU/ML — SIGNIFICANT CHANGE UP (ref 0.27–4.2)
WBC # BLD: 5.15 K/UL — SIGNIFICANT CHANGE UP (ref 3.8–10.5)
WBC # FLD AUTO: 5.15 K/UL — SIGNIFICANT CHANGE UP (ref 3.8–10.5)

## 2022-06-28 PROCEDURE — 71046 X-RAY EXAM CHEST 2 VIEWS: CPT | Mod: 26

## 2022-06-28 PROCEDURE — 99223 1ST HOSP IP/OBS HIGH 75: CPT

## 2022-06-28 PROCEDURE — 93010 ELECTROCARDIOGRAM REPORT: CPT

## 2022-06-28 PROCEDURE — 99285 EMERGENCY DEPT VISIT HI MDM: CPT | Mod: 25

## 2022-06-28 RX ORDER — POTASSIUM CHLORIDE 20 MEQ
40 PACKET (EA) ORAL ONCE
Refills: 0 | Status: COMPLETED | OUTPATIENT
Start: 2022-06-28 | End: 2022-06-28

## 2022-06-28 RX ORDER — PAMIDRONATE DISODIUM 9 MG/ML
60 INJECTION, SOLUTION INTRAVENOUS ONCE
Refills: 0 | Status: DISCONTINUED | OUTPATIENT
Start: 2022-06-28 | End: 2022-06-28

## 2022-06-28 RX ORDER — SODIUM CHLORIDE 9 MG/ML
1000 INJECTION INTRAMUSCULAR; INTRAVENOUS; SUBCUTANEOUS ONCE
Refills: 0 | Status: COMPLETED | OUTPATIENT
Start: 2022-06-28 | End: 2022-06-28

## 2022-06-28 RX ORDER — SODIUM CHLORIDE 9 MG/ML
1000 INJECTION INTRAMUSCULAR; INTRAVENOUS; SUBCUTANEOUS
Refills: 0 | Status: DISCONTINUED | OUTPATIENT
Start: 2022-06-28 | End: 2022-06-29

## 2022-06-28 RX ORDER — CALCITONIN SALMON 200 [IU]/ML
280 INJECTION, SOLUTION INTRAMUSCULAR ONCE
Refills: 0 | Status: COMPLETED | OUTPATIENT
Start: 2022-06-28 | End: 2022-06-28

## 2022-06-28 RX ORDER — METOPROLOL TARTRATE 50 MG
25 TABLET ORAL ONCE
Refills: 0 | Status: COMPLETED | OUTPATIENT
Start: 2022-06-28 | End: 2022-06-28

## 2022-06-28 RX ADMIN — SODIUM CHLORIDE 1000 MILLILITER(S): 9 INJECTION INTRAMUSCULAR; INTRAVENOUS; SUBCUTANEOUS at 19:12

## 2022-06-28 RX ADMIN — SODIUM CHLORIDE 1000 MILLILITER(S): 9 INJECTION INTRAMUSCULAR; INTRAVENOUS; SUBCUTANEOUS at 20:34

## 2022-06-28 RX ADMIN — Medication 40 MILLIEQUIVALENT(S): at 21:52

## 2022-06-28 RX ADMIN — CALCITONIN SALMON 280 INTERNATIONAL UNIT(S): 200 INJECTION, SOLUTION INTRAMUSCULAR at 21:52

## 2022-06-28 NOTE — H&P ADULT - PROBLEM SELECTOR PLAN 1
Unclear cause.  s/p extensive outpatient w/u  - trend Ca++ and give IV fluid until improved  - outpatient f/u with endocrine and hematology

## 2022-06-28 NOTE — H&P ADULT - NSHPREVIEWOFSYSTEMS_GEN_ALL_CORE
Review of Systems:   CONSTITUTIONAL: No fever or chills  EYES: No eye pain, visual disturbances, or discharge  ENMT:  No difficulty hearing, no throat pain  NECK: No pain or stiffness  RESPIRATORY: No cough, No shortness of breath  CARDIOVASCULAR: No chest pain, palpitations, dizziness, or leg swelling  GASTROINTESTINAL: No abdominal pain, nausea, vomiting or diarrhea  GENITOURINARY: No dysuria, or hematuria  NEUROLOGICAL: No headaches, weakness, or numbness  SKIN: No rashes, or lesions   LYMPH NODES: No enlarged glands  ENDOCRINE: No heat or cold intolerance  MUSCULOSKELETAL: No joint pain or swelling  PSYCHIATRIC: No depression or anxiety  HEME/LYMPH: No easy bruising, or bleeding  ALLERGY AND IMMUNOLOGIC: No hives or eczema

## 2022-06-28 NOTE — H&P ADULT - NSHPLABSRESULTS_GEN_ALL_CORE
06-28    140  |  107  |  31<H>  ----------------------------<  99  3.6   |  22  |  1.77<H>    Ca    12.8<H>      28 Jun 2022 22:00  Phos  3.2     06-28  Mg     2.00     06-28    TPro  7.4  /  Alb  3.7  /  TBili  0.3  /  DBili  x   /  AST  24  /  ALT  22  /  AlkPhos  100  06-28                            11.0   5.15  )-----------( 259      ( 28 Jun 2022 19:06 )             35.7           EKG tracing reviewed: sinus with 1st degree AV block

## 2022-06-28 NOTE — ED PROVIDER NOTE - CARE PLAN
Principal Discharge DX:	General medical exam   1 Principal Discharge DX:	Hypercalcemia  Secondary Diagnosis:	TOOTIE (acute kidney injury)

## 2022-06-28 NOTE — ED PROVIDER NOTE - PROGRESS NOTE DETAILS
Pt Ca 14.1, Ionized CA 1.85, TSH and PTH WNL. Cr 2.01 which Is elevated from previous. Pt states likely due to fasting today for lab testing. At this time will admit pt to hospital given her lab values and start meds for HyperCA. Pt amenable to admission.

## 2022-06-28 NOTE — ED PROVIDER NOTE - OBJECTIVE STATEMENT
60 y/o F w/ hx of HTN that was sent by her doctor for Hypercalcemia level 14.2 today on lab work. Per pt, she has had Hypercalcemia since Oct 2021 when they thought she had multiple myeloma but had biopsies confirming no MM but now thinking possible Sarcoidosis. Pt has no symptoms such as psychic overtones, body/muscle aches, kidney stones, flank pain, hematuria, headaches, vision/hearing changes, weakness, fatigue, chest pain, abd pain, SOB. Per pt, she is not on any OTC supplements, does not drink milk or milk products. No thyroid issues. She has otherwise been well. No weight loss, night sweats. Has otherwise been normal. Non smoker.    Doctor Evan Peck @ [phone]

## 2022-06-28 NOTE — ED ADULT TRIAGE NOTE - CHIEF COMPLAINT QUOTE
states" I am having high calcium levels 14.2 done today " states my Dr Stein was testing me to screened for sarcoidosis "

## 2022-06-28 NOTE — ED PROVIDER NOTE - EMPLOYMENT
LVM for callback to inform patient that [facility] received prescription for [employer] and benefits investigation is required.  [facility] will be back in touch once insurance determination is received.     N/A

## 2022-06-28 NOTE — ED ADULT NURSE NOTE - OBJECTIVE STATEMENT
A&Ox4, reporting to ED referred by PCP for elevated calcium levels. Patient undergoing testing for sarcoidosis. A&Ox4, reporting to ED referred by PCP for elevated calcium levels. Patient undergoing testing for sarcoidosis due to chronic fatigue and elevated calcium levels. Denies all complaints at this time. PMH of HTN. NKDA. IV to R A/C 20G.

## 2022-06-28 NOTE — ED PROVIDER NOTE - CLINICAL SUMMARY MEDICAL DECISION MAKING FREE TEXT BOX
60 y/o F w/ hx of HTN that was sent by her doctor for Hypercalcemia level 14.2 today on lab work. Pt has no symptoms and has a benign exam. Pt already worked up for malignancy, thyroid issues and abnormal ingestions vs inability to excrete calcium. Today we Will obtain labs to confirm if has abnormal labs and reassess. In meantime will provide IVF.

## 2022-06-28 NOTE — H&P ADULT - NSHPPHYSICALEXAM_GEN_ALL_CORE
Vital Signs Last 24 Hrs  T(C): 36.7 (29 Jun 2022 00:09), Max: 36.7 (28 Jun 2022 16:51)  T(F): 98.1 (29 Jun 2022 00:09), Max: 98.1 (29 Jun 2022 00:09)  HR: 80 (29 Jun 2022 00:09) (78 - 88)  BP: 149/93 (29 Jun 2022 00:09) (144/98 - 168/88)  BP(mean): --  RR: 18 (29 Jun 2022 00:09) (16 - 18)  SpO2: 100% (29 Jun 2022 00:09) (100% - 100%)    PHYSICAL EXAM:  GENERAL: No Acute Distress  EYES: conjunctiva and sclera clear  ENMT: Moist mucous membranes   NECK: Supple  PULMONARY: Clear to auscultation bilaterally  CARDIAC: Regular rate and rhythm; No murmurs, rubs, or gallops  GASTROINTESTINAL: Abdomen soft, Nontender, Nondistended; Bowel sounds normal  EXTREMITIES:   No clubbing, cyanosis, or pedal edema  PSYCH: Normal Affect, Normal Behavior  NEUROLOGY:   - Mental status A&O x 3  SKIN: No rashes or lesions  MUSCULOSKELETAL: No joint swelling

## 2022-06-28 NOTE — H&P ADULT - HISTORY OF PRESENT ILLNESS
63 y/o F w/ PMH of HTN, and recurrent hypercalcemia p/w elevated Ca.  Pt had Ca of 14.2 on outpatient labs and was sent to the ED for further management.  She reports feeling at baseline without any new complaints.  She has had extensive w/u for hypercalcemia with her PMD, endocrinologist and hematologist including bone marrow biopsy which was negative for multiple myeloma. PT reports no recent fevers, chills, bone pain, myalgia, nausea, abd pain, or constipation.      In the ED, pt was given IV NS and calcitonin.

## 2022-06-29 ENCOUNTER — TRANSCRIPTION ENCOUNTER (OUTPATIENT)
Age: 62
End: 2022-06-29

## 2022-06-29 VITALS
SYSTOLIC BLOOD PRESSURE: 142 MMHG | RESPIRATION RATE: 18 BRPM | TEMPERATURE: 98 F | HEART RATE: 73 BPM | OXYGEN SATURATION: 100 % | DIASTOLIC BLOOD PRESSURE: 73 MMHG

## 2022-06-29 DIAGNOSIS — Z29.9 ENCOUNTER FOR PROPHYLACTIC MEASURES, UNSPECIFIED: ICD-10-CM

## 2022-06-29 DIAGNOSIS — E83.52 HYPERCALCEMIA: ICD-10-CM

## 2022-06-29 DIAGNOSIS — I10 ESSENTIAL (PRIMARY) HYPERTENSION: ICD-10-CM

## 2022-06-29 DIAGNOSIS — N17.9 ACUTE KIDNEY FAILURE, UNSPECIFIED: ICD-10-CM

## 2022-06-29 LAB
ALBUMIN SERPL ELPH-MCNC: 3.5 G/DL — SIGNIFICANT CHANGE UP (ref 3.3–5)
ALP SERPL-CCNC: 84 U/L — SIGNIFICANT CHANGE UP (ref 40–120)
ALT FLD-CCNC: 15 U/L — SIGNIFICANT CHANGE UP (ref 4–33)
ANION GAP SERPL CALC-SCNC: 11 MMOL/L — SIGNIFICANT CHANGE UP (ref 7–14)
AST SERPL-CCNC: 23 U/L — SIGNIFICANT CHANGE UP (ref 4–32)
BASOPHILS # BLD AUTO: 0.02 K/UL — SIGNIFICANT CHANGE UP (ref 0–0.2)
BASOPHILS NFR BLD AUTO: 0.5 % — SIGNIFICANT CHANGE UP (ref 0–2)
BILIRUB SERPL-MCNC: 0.3 MG/DL — SIGNIFICANT CHANGE UP (ref 0.2–1.2)
BUN SERPL-MCNC: 28 MG/DL — HIGH (ref 7–23)
CALCIUM SERPL-MCNC: 11.5 MG/DL — HIGH (ref 8.4–10.5)
CHLORIDE SERPL-SCNC: 107 MMOL/L — SIGNIFICANT CHANGE UP (ref 98–107)
CK SERPL-CCNC: 83 U/L — SIGNIFICANT CHANGE UP (ref 25–170)
CO2 SERPL-SCNC: 22 MMOL/L — SIGNIFICANT CHANGE UP (ref 22–31)
CREAT SERPL-MCNC: 1.86 MG/DL — HIGH (ref 0.5–1.3)
CRP SERPL-MCNC: 3.8 MG/L — SIGNIFICANT CHANGE UP
EGFR: 31 ML/MIN/1.73M2 — LOW
EOSINOPHIL # BLD AUTO: 0.21 K/UL — SIGNIFICANT CHANGE UP (ref 0–0.5)
EOSINOPHIL NFR BLD AUTO: 4.8 % — SIGNIFICANT CHANGE UP (ref 0–6)
GLUCOSE SERPL-MCNC: 104 MG/DL — HIGH (ref 70–99)
HCT VFR BLD CALC: 32.2 % — LOW (ref 34.5–45)
HGB BLD-MCNC: 10 G/DL — LOW (ref 11.5–15.5)
IANC: 2.74 K/UL — SIGNIFICANT CHANGE UP (ref 1.8–7.4)
IMM GRANULOCYTES NFR BLD AUTO: 0.7 % — SIGNIFICANT CHANGE UP (ref 0–1.5)
LYMPHOCYTES # BLD AUTO: 0.84 K/UL — LOW (ref 1–3.3)
LYMPHOCYTES # BLD AUTO: 19.4 % — SIGNIFICANT CHANGE UP (ref 13–44)
MAGNESIUM SERPL-MCNC: 2 MG/DL — SIGNIFICANT CHANGE UP (ref 1.6–2.6)
MCHC RBC-ENTMCNC: 25.9 PG — LOW (ref 27–34)
MCHC RBC-ENTMCNC: 31.1 GM/DL — LOW (ref 32–36)
MCV RBC AUTO: 83.4 FL — SIGNIFICANT CHANGE UP (ref 80–100)
MONOCYTES # BLD AUTO: 0.49 K/UL — SIGNIFICANT CHANGE UP (ref 0–0.9)
MONOCYTES NFR BLD AUTO: 11.3 % — SIGNIFICANT CHANGE UP (ref 2–14)
NEUTROPHILS # BLD AUTO: 2.74 K/UL — SIGNIFICANT CHANGE UP (ref 1.8–7.4)
NEUTROPHILS NFR BLD AUTO: 63.3 % — SIGNIFICANT CHANGE UP (ref 43–77)
NRBC # BLD: 0 /100 WBCS — SIGNIFICANT CHANGE UP
NRBC # FLD: 0 K/UL — SIGNIFICANT CHANGE UP
PHOSPHATE SERPL-MCNC: 2.7 MG/DL — SIGNIFICANT CHANGE UP (ref 2.5–4.5)
PLATELET # BLD AUTO: 212 K/UL — SIGNIFICANT CHANGE UP (ref 150–400)
POTASSIUM SERPL-MCNC: 4.2 MMOL/L — SIGNIFICANT CHANGE UP (ref 3.5–5.3)
POTASSIUM SERPL-SCNC: 4.2 MMOL/L — SIGNIFICANT CHANGE UP (ref 3.5–5.3)
PROT SERPL-MCNC: 6.7 G/DL — SIGNIFICANT CHANGE UP (ref 6–8.3)
RBC # BLD: 3.86 M/UL — SIGNIFICANT CHANGE UP (ref 3.8–5.2)
RBC # FLD: 15.1 % — HIGH (ref 10.3–14.5)
SODIUM SERPL-SCNC: 140 MMOL/L — SIGNIFICANT CHANGE UP (ref 135–145)
WBC # BLD: 4.33 K/UL — SIGNIFICANT CHANGE UP (ref 3.8–10.5)
WBC # FLD AUTO: 4.33 K/UL — SIGNIFICANT CHANGE UP (ref 3.8–10.5)

## 2022-06-29 PROCEDURE — 99239 HOSP IP/OBS DSCHRG MGMT >30: CPT

## 2022-06-29 PROCEDURE — 99254 IP/OBS CNSLTJ NEW/EST MOD 60: CPT | Mod: GC

## 2022-06-29 RX ORDER — METOPROLOL TARTRATE 50 MG
25 TABLET ORAL DAILY
Refills: 0 | Status: DISCONTINUED | OUTPATIENT
Start: 2022-06-29 | End: 2022-06-29

## 2022-06-29 RX ORDER — SODIUM CHLORIDE 9 MG/ML
1000 INJECTION INTRAMUSCULAR; INTRAVENOUS; SUBCUTANEOUS ONCE
Refills: 0 | Status: COMPLETED | OUTPATIENT
Start: 2022-06-29 | End: 2022-06-29

## 2022-06-29 RX ORDER — SODIUM CHLORIDE 9 MG/ML
1000 INJECTION INTRAMUSCULAR; INTRAVENOUS; SUBCUTANEOUS
Refills: 0 | Status: DISCONTINUED | OUTPATIENT
Start: 2022-06-29 | End: 2022-06-29

## 2022-06-29 RX ORDER — METOPROLOL TARTRATE 50 MG
1 TABLET ORAL
Qty: 0 | Refills: 0 | DISCHARGE

## 2022-06-29 RX ADMIN — SODIUM CHLORIDE 125 MILLILITER(S): 9 INJECTION INTRAMUSCULAR; INTRAVENOUS; SUBCUTANEOUS at 00:59

## 2022-06-29 RX ADMIN — SODIUM CHLORIDE 1000 MILLILITER(S): 9 INJECTION INTRAMUSCULAR; INTRAVENOUS; SUBCUTANEOUS at 11:29

## 2022-06-29 RX ADMIN — Medication 25 MILLIGRAM(S): at 00:08

## 2022-06-29 NOTE — PROGRESS NOTE ADULT - SUBJECTIVE AND OBJECTIVE BOX
Patient is a 61y old  Female who presents with a chief complaint of Elevated Calcium    SUBJECTIVE / OVERNIGHT EVENTS:    Reports feels well, no CP, SOB, f/c/n/v  Report no bone or joint pain, no constipation, BM this am  No issues voiding    T(C): 36.6 (06-29-22 @ 04:14), Max: 36.7 (06-28-22 @ 16:51)  HR: 88 (06-29-22 @ 04:14) (78 - 88)  BP: 129/89 (06-29-22 @ 04:14) (129/89 - 168/88)  RR: 18 (06-29-22 @ 04:14) (16 - 18)  SpO2: 100% (06-29-22 @ 04:14) (100% - 100%)    PHYSICAL EXAM:  GENERAL: NAD, well-developed  CHEST/LUNG: Clear to auscultation bilaterally; No wheeze  HEART: Regular rate and rhythm; No murmurs, rubs, or gallops  ABDOMEN: Soft, Nontender, Nondistended; Bowel sounds present  EXTREMITIES:   warm and well perfused, No clubbing, cyanosis, or edema  PSYCH: AAOx3  NEUROLOGY: non-focal  SKIN: No rashes or lesions    LABS:                        10.0   4.33  )-----------( 212      ( 29 Jun 2022 05:59 )             32.2     06-29    140  |  107  |  28<H>  ----------------------------<  104<H>  4.2   |  22  |  1.86<H>    Ca    11.5<H>      29 Jun 2022 05:59  Phos  2.7     06-29  Mg     2.00     06-29    TPro  6.7  /  Alb  3.5  /  TBili  0.3  /  DBili  x   /  AST  23  /  ALT  15  /  AlkPhos  84  06-29      Consultant(s) Notes Reviewed:    Care Discussed with Consultants/Other Providers: Dr. Evan Givens MD ()

## 2022-06-29 NOTE — PROGRESS NOTE ADULT - PROBLEM SELECTOR PLAN 1
Recurrent and ongoing w/u as OP re: underlying cause.  Supposedly saw endo as OP non-PTH mediated reports s/p 24H urine x 2.  Elevated Vitamin D 1,25 >200.  Reports PET scan and MRI were c/f MM however s/p bone marrow x 2 that is negative, seen by angelita Baltazar at [facility].  Recently referred to Dr. Tosha ANDRADE (pul, [facility]) for r/o sarcoid.  Was doing w/u and labs and that is where Ca was again noted to be elevated w/ TOOTIE so sent in.  Per d/w pulmonologist atypical presentation for sarcoid however ultimately diagnosis would require tissue diagnosis, has discordance with PET and MRI imaging, states lesions in bones and muscle and his plan was to bx one of these sites.  Also concern for rheumatologic issue such as RA or polymyositis.   OP w/u limited by hyperCa.  - pt asymptomatic  - Ca 14.1-->11.5 s/p calcitonin and 2L IVF  - will c/s endo re: temporizing med (?bisphosphonate) to prevent recurrence while etiology being determined, pt currently asking when she can be dc   - trend Cr, downtrending, unclear recent baseline

## 2022-06-29 NOTE — DISCHARGE NOTE NURSING/CASE MANAGEMENT/SOCIAL WORK - NSDCFUADDAPPT_GEN_ALL_CORE_FT
Follow up with your primary care doctor within one week of discharge. If you do not have one, you can call the medicine clinic at [phone] to make an appointment.

## 2022-06-29 NOTE — CONSULT NOTE ADULT - SUBJECTIVE AND OBJECTIVE BOX
HPI:  63 y/o F w/ PMH of HTN, and recurrent hypercalcemia p/w elevated Ca noted on outpatient labs.     Admitted in November 2021 with same issue, Calcium elevated to 14s at that time. Outpatient blood work at that time showed iPTH of 11, 25(OH) vit D of 20.3, and markedly elevated 1,25(OH) vit D of 230, PTHrP<2. Patient was treated with calcitonin, fluids and Pamidronate 60 mg x 1.     Patient now presents with similar picture. Calcium corrects to 14.2 on admission, now corrects to 11.9 s/p fluids and calcitonin x 1 dose  PTH is normal at 15 (when calcium was 14.2)    PAST MEDICAL & SURGICAL HISTORY:  Hypertension      ACL (anterior cruciate ligament) tear  ACL reconstruction 2016 (L)      History of bilateral tubal ligation  1999      Uterine fibroid          FAMILY HISTORY:  Family history of hypertension (Mother)        Social History: No tobacco use    Home Medications:  metoprolol succinate 25 mg oral tablet, extended release: 1 tab(s) orally once a day (29 Jun 2022 01:10)      MEDICATIONS  (STANDING):  metoprolol succinate ER 25 milliGRAM(s) Oral daily    MEDICATIONS  (PRN):      Allergies    No Known Drug Allergies  seasonal allergies: sinus congestion, itchy eyes (Rhinitis)    Intolerances      Review of Systems:  Constitutional: No fever  Eyes: No blurry vision  Neuro: No tremors  HEENT: No pain  Cardiovascular: No chest pain, palpitations  Respiratory: No SOB, no cough  GI: No nausea, vomiting, abdominal pain  : No dysuria  Skin: no rash  Psych: no depression  Endocrine: no polyuria, polydipsia  Hem/lymph: no swelling  Osteoporosis: no fractures    PHYSICAL EXAM:  VITALS: T(C): 36.9 (06-29-22 @ 14:43)  T(F): 98.4 (06-29-22 @ 14:43), Max: 98.4 (06-29-22 @ 14:43)  HR: 73 (06-29-22 @ 14:43) (73 - 88)  BP: 142/73 (06-29-22 @ 14:43) (129/89 - 168/88)  RR:  (16 - 18)  SpO2:  (100% - 100%)  Wt(kg): --  GENERAL: NAD  EYES: No proptosis, no lid lag, anicteric  THYROID: Normal size, no palpable nodules  RESPIRATORY: Clear to auscultation bilaterally  CARDIOVASCULAR: Regular rate and rhythm  GI: Soft, nontender, non distended  EXT: b/l feet without wounds; 2+ pulses  PSYCH: Alert and oriented x 3, reactive mood                              10.0   4.33  )-----------( 212      ( 29 Jun 2022 05:59 )             32.2       06-29    140  |  107  |  28<H>  ----------------------------<  104<H>  4.2   |  22  |  1.86<H>    eGFR: 31<L>    Ca    11.5<H>      06-29  Mg     2.00     06-29  Phos  2.7     06-29    TPro  6.7  /  Alb  3.5  /  TBili  0.3  /  DBili  x   /  AST  23  /  ALT  15  /  AlkPhos  84  06-29      Thyroid Function Tests:  06-28 @ 19:06 TSH 1.14 FreeT4 -- T3 -- Anti TPO -- Anti Thyroglobulin Ab -- TSI --              Radiology:                HPI:  61 y/o F w/ PMH of HTN, and recurrent hypercalcemia p/w elevated Ca noted on outpatient labs.     Admitted in November 2021 with same issue, Calcium elevated to 14s at that time. Outpatient blood work at that time showed iPTH of 11, 25(OH) vit D of 20.3, and markedly elevated 1,25(OH) vit D of 230, PTHrP<2. Patient was treated with calcitonin, fluids and Pamidronate 60 mg x 1.     Patient now presents with similar picture. Calcium corrects to 14.2 on admission, now corrects to 11.9 s/p fluids and calcitonin x 1 dose  PTH is normal at 15 (when calcium was 14.2)    She was worked up for multiple myeloma at [facility], found to be negative. Now recently diagnosed with sarcoidosis by pulmonologist at [facility]. Recommended to start Prednisone 10 mg daily on 6/30. Follows with Endocrinologist Dr. Irma Wilson at [facility].    PAST MEDICAL & SURGICAL HISTORY:  Hypertension      ACL (anterior cruciate ligament) tear  ACL reconstruction 2016 (L)      History of bilateral tubal ligation  1999      Uterine fibroid          FAMILY HISTORY:  Family history of hypertension (Mother)        Social History: No tobacco use    Home Medications:  metoprolol succinate 25 mg oral tablet, extended release: 1 tab(s) orally once a day (29 Jun 2022 01:10)      MEDICATIONS  (STANDING):  metoprolol succinate ER 25 milliGRAM(s) Oral daily    MEDICATIONS  (PRN):      Allergies    No Known Drug Allergies  seasonal allergies: sinus congestion, itchy eyes (Rhinitis)    Intolerances      Review of Systems:  Constitutional: No fever  Eyes: No blurry vision  Neuro: No tremors  HEENT: No pain  Cardiovascular: No chest pain, palpitations  Respiratory: No SOB, no cough  GI: No nausea, vomiting, abdominal pain  : No dysuria  Skin: no rash  Psych: no depression  Endocrine: no polyuria, polydipsia  Hem/lymph: no swelling  Osteoporosis: no fractures    PHYSICAL EXAM:  VITALS: T(C): 36.9 (06-29-22 @ 14:43)  T(F): 98.4 (06-29-22 @ 14:43), Max: 98.4 (06-29-22 @ 14:43)  HR: 73 (06-29-22 @ 14:43) (73 - 88)  BP: 142/73 (06-29-22 @ 14:43) (129/89 - 168/88)  RR:  (16 - 18)  SpO2:  (100% - 100%)  Wt(kg): --  GENERAL: NAD  EYES: No proptosis, no lid lag, anicteric  THYROID: Normal size, no palpable nodules  RESPIRATORY: Clear to auscultation bilaterally  CARDIOVASCULAR: Regular rate and rhythm  GI: Soft, nontender, non distended  EXT: b/l feet without wounds; 2+ pulses  PSYCH: Alert and oriented x 3, reactive mood                              10.0   4.33  )-----------( 212      ( 29 Jun 2022 05:59 )             32.2       06-29    140  |  107  |  28<H>  ----------------------------<  104<H>  4.2   |  22  |  1.86<H>    eGFR: 31<L>    Ca    11.5<H>      06-29  Mg     2.00     06-29  Phos  2.7     06-29    TPro  6.7  /  Alb  3.5  /  TBili  0.3  /  DBili  x   /  AST  23  /  ALT  15  /  AlkPhos  84  06-29      Thyroid Function Tests:  06-28 @ 19:06 TSH 1.14 FreeT4 -- T3 -- Anti TPO -- Anti Thyroglobulin Ab -- TSI --              Radiology:                HPI:  63 y/o F w/ PMH of HTN, and recurrent hypercalcemia p/w elevated Ca noted on outpatient labs.     Admitted in November 2021 with same issue, Calcium elevated to 14s at that time. Outpatient blood work at that time showed iPTH of 11, 25(OH) vit D of 20.3, and markedly elevated 1,25(OH) vit D of 230, PTHrP<2. Patient was treated with calcitonin, fluids and Pamidronate 60 mg x 1.     Patient now presents with similar picture. Calcium corrects to 14.2 on admission, now corrects to 11.9 s/p fluids and calcitonin x 1 dose  PTH is normal at 15 (when calcium was 14.2)    She was worked up for multiple myeloma at [facility], found to be negative. Now recently diagnosed with sarcoidosis by pulmonologist at [facility]. Recommended to start Prednisone 10 mg daily on 6/30. Follows with Endocrinologist Dr. Irma Wilson at [facility].    PAST MEDICAL & SURGICAL HISTORY:  Hypertension      ACL (anterior cruciate ligament) tear  ACL reconstruction 2016 (L)      History of bilateral tubal ligation  1999      Uterine fibroid          FAMILY HISTORY:  Family history of hypertension (Mother)        Social History: No tobacco use    Home Medications:  metoprolol succinate 25 mg oral tablet, extended release: 1 tab(s) orally once a day (29 Jun 2022 01:10)      MEDICATIONS  (STANDING):  metoprolol succinate ER 25 milliGRAM(s) Oral daily    MEDICATIONS  (PRN):      Allergies    No Known Drug Allergies  seasonal allergies: sinus congestion, itchy eyes (Rhinitis)    Intolerances      Review of Systems:  Constitutional: No fever  Eyes: No blurry vision  Neuro: No tremors  HEENT: No pain  Cardiovascular: No chest pain, palpitations  Respiratory: No SOB, no cough  GI: No nausea, vomiting, abdominal pain  : No dysuria  Skin: no rash  Psych: no depression  Endocrine: no polyuria, polydipsia  Hem/lymph: no swelling  Osteoporosis: no fractures    PHYSICAL EXAM:  VITALS: T(C): 36.9 (06-29-22 @ 14:43)  T(F): 98.4 (06-29-22 @ 14:43), Max: 98.4 (06-29-22 @ 14:43)  HR: 73 (06-29-22 @ 14:43) (73 - 88)  BP: 142/73 (06-29-22 @ 14:43) (129/89 - 168/88)  RR:  (16 - 18)  SpO2:  (100% - 100%)  Wt(kg): --  GENERAL: NAD  EYES: No proptosis, no lid lag, anicteric  THYROID: Normal size, no palpable nodules  RESPIRATORY: Clear to auscultation bilaterally  CARDIOVASCULAR: Regular rate and rhythm  GI: Soft, nontender, non distended  EXT: b/l feet without wounds; 2+ pulses  PSYCH: Alert and oriented x 3, reactive mood                              10.0   4.33  )-----------( 212      ( 29 Jun 2022 05:59 )             32.2       06-29    140  |  107  |  28<H>  ----------------------------<  104<H>  4.2   |  22  |  1.86<H>    eGFR: 31<L>    Ca    11.5<H>      06-29  Mg     2.00     06-29  Phos  2.7     06-29    TPro  6.7  /  Alb  3.5  /  TBili  0.3  /  DBili  x   /  AST  23  /  ALT  15  /  AlkPhos  84  06-29      Thyroid Function Tests:  06-28 @ 19:06 TSH 1.14 FreeT4 -- T3 -- Anti TPO -- Anti Thyroglobulin Ab -- TSI --

## 2022-06-29 NOTE — CONSULT NOTE ADULT - ASSESSMENT
61 y/o F w/ PMH of HTN, and recurrent hypercalcemia p/w elevated Ca noted on outpatient labs.     #Hypercalcemia  Ca on arrival 14.2 corrected -> now 11.9 corrected s/p IVF and 1x calcitonin dose  Appears to be non-PTH mediated, however it is inappropriately normal (PTH 11 in the past, now 15).   May be calcitriol mediated 2/2 lymphoma or granulomatous disease given prior Vit D 1,25 elevation to 230.   PTHrp was negative  Patient had been undergoing Myeloma workup at [facility]     Recs;  -Check Vit D 25 and Vit D 1,25 levels   -IVF hydration  -No further Calcitonin for now since Ca levels are < 12 at this time  -Patient may need steroids if calcitriol mediated. Bisphosphonates are less likely to be as effective      #HTN  BP Goal < 130/80  Continue management per primary team    Lola Gonzalez MD  Endocrine Fellow  Can be reached via teams. For follow up questions, discharge recommendations, or new consults, please call answering service at [phone] (weekdays); [phone] (nights/weekends)   61 y/o F w/ PMH of HTN, and recurrent hypercalcemia p/w elevated Ca noted on outpatient labs.     #Hypercalcemia  Ca on arrival 14.2 corrected -> now 11.9 corrected s/p IVF and 1x calcitonin dose  Diagnosed with sarcoidosis as an outpatient at [facility] - plan to start Prednisone 10 mg daily   Consistent with elevated Vit D 1,25 elevation to 230.   PTHrp was negative    Recs;  -Check Vit D 25 and Vit D 1,25 levels   -Can start Prednisone 10 mg daily (patient is leaving AMA and will start this tomorrow - her pulmonologist prescribed this for her)  -PO hydration  -No further Calcitonin for now since Ca levels are < 12 at this time  -Hold bisphosphonates are less likely to be as effective      #HTN  BP Goal < 130/80  Continue management per primary team    Lola Gonzalez MD  Endocrine Fellow  Can be reached via teams. For follow up questions, discharge recommendations, or new consults, please call answering service at [phone] (weekdays); [phone] (nights/weekends)   61 y/o F w/ PMH of HTN, and recurrent hypercalcemia p/w elevated Ca noted on outpatient labs.     #Hypercalcemia  Ca on arrival 14.2 corrected -> now 11.9 corrected s/p IVF and 1x calcitonin dose  Diagnosed with sarcoidosis as an outpatient at [facility] - plan to start Prednisone 10 mg daily   Consistent with elevated Vit D 1,25 elevation to 230.   PTHrp was negative    Recs;  -Check Vit D 25 and Vit D 1,25 levels   -Can start Prednisone 10 mg daily (patient is leaving AMA and will start this tomorrow - her pulmonologist prescribed this for her)  -PO hydration  -No further Calcitonin for now since Ca levels are < 12 at this time  -Hold bisphosphonates are less likely to be as effective        #HTN  BP Goal < 130/80  Continue management per primary team    Lola Gonzalez MD  Endocrine Fellow  Can be reached via teams. For follow up questions, discharge recommendations, or new consults, please call answering service at [phone] (weekdays); [phone] (nights/weekends)

## 2022-06-29 NOTE — DISCHARGE NOTE PROVIDER - NSDCFUADDAPPT_GEN_ALL_CORE_FT
Follow up with your primary care doctor within one week of discharge. If you do not have one, you can call the medicine clinic at [phone] to make an appointment.

## 2022-06-29 NOTE — DISCHARGE NOTE PROVIDER - CARE PROVIDER_API CALL
Your Primary Care Provider,   Phone: (   )    -  Fax: (   )    -  Follow Up Time:    Your Primary Care Provider,   Phone: (   )    -  Fax: (   )    -  Follow Up Time:     jas paris  Phone: [phone]  Fax: [phone]  Follow Up Time:

## 2022-06-29 NOTE — CONSULT NOTE ADULT - ATTENDING COMMENTS
Patient has decided to leave AMA -she will f/u with her outpt endocrinologist and pulmonologist.    Susan Hugo DO

## 2022-06-29 NOTE — DISCHARGE NOTE NURSING/CASE MANAGEMENT/SOCIAL WORK - NSDCPEFALRISK_GEN_ALL_CORE
For information on Fall & Injury Prevention, visit: [email or web address] OR  [email or web address] OR  https://www.cdc.gov/steadi/patient.html

## 2022-06-29 NOTE — DISCHARGE NOTE PROVIDER - PROVIDER TOKENS
FREE:[LAST:[Your Primary Care Provider],PHONE:[(   )    -],FAX:[(   )    -]] FREE:[LAST:[Your Primary Care Provider],PHONE:[(   )    -],FAX:[(   )    -]],FREE:[LAST:[wellington],FIRST:[jas],PHONE:[phone]],FAX:[phone]]]

## 2022-06-29 NOTE — PATIENT PROFILE ADULT - FALL HARM RISK - FACTORS NURSING JUDGEMENT
[facility]  SCIP  Core Measure Compliance  Work List    24 hours from Anesthesia End Time is: 7/19/2018 @1441    VTE must be administered and applied within 24 hours of anesthesia end time. to addressed by direct care RN. Patient should have both Chemical and Mechanical  Prophylaxis    Beta Blocker  It is recommended that patients take their beta blocker the day of surgery. Was the patient on a beta blocker prior to surgery? No  Did the physician reorder the beta blocker for post op medications? not applicable  If the physician did not reorder the beta blocker was he notified not applicable      Prophylactic Post Op Antibiotics  Is the antibiotic scheduled to end within 24 hours of Anesthesia end time? No    Davis  Does patient have a davis? No  It is recommended that the davis be discontinued by POD#2. Place a sticky note to the physician to remind him/her that the davis should be out by the following date: 7/20/2018.
No

## 2022-06-29 NOTE — DISCHARGE NOTE NURSING/CASE MANAGEMENT/SOCIAL WORK - PATIENT PORTAL LINK FT
You can access the FollowMyHealth Patient Portal offered by [facility] by registering at the following website: [email or web address]. By joining [employer]’s FollowMyHealth portal, you will also be able to view your health information using other applications (apps) compatible with our system.

## 2022-06-29 NOTE — DISCHARGE NOTE PROVIDER - NSDCCPCAREPLAN_GEN_ALL_CORE_FT
PRINCIPAL DISCHARGE DIAGNOSIS  Diagnosis: Hypercalcemia  Assessment and Plan of Treatment: You were admitted to the hospital with elevated calcium levels (hypercalcemia) of 14.1.  Your calcium levels were treated with IV Fluids and IV Calcitonin.  Your calcium levels have temporarily returned to a normal level of 11.5.  Your case was discussed with your outpatient providers who recommend inpatient workup including rheumatology consultation and biopsy.  You declined further inpatient workup of this and wish to proceed with outpatient monitoring.  Please follow up with your primary care doctor within 1 week of discharge for repeat bloodwork to monitor your calcium and further management.      SECONDARY DISCHARGE DIAGNOSES  Diagnosis: TOOTIE (acute kidney injury)  Assessment and Plan of Treatment: You were found in the hospital to have elevated kidney function tests, likely caused by your elevated calcium levels.  At hospital discharge, your kidney function was elevated (Creatinine 1.86).  You declined further inpatient workup of this and wish to proceed with outpatient monitoring.  Please follow up with your primary care doctor within 1 week of discharge for repeat kidney function labs and further management.    Diagnosis: Essential hypertension  Assessment and Plan of Treatment: You have a history of high blood pressure.  Please continue medications as currently prescribed.  Please continue low salt, low cholesterol (DASH) diet. Follow up with your primary care doctor for further management.

## 2022-06-29 NOTE — DISCHARGE NOTE PROVIDER - HOSPITAL COURSE
62 F with HTN and recurrent hypercalcemia p/w elevated Ca and TOOTIE.    Hypercalcemia  - Sent to ER 2/2 asymptomatic hypercalcemia on outpatient labs, on admission Ca 14.1, now Ca --> 11.5 s/p calcitonin and 2L IVF  - Recurrent and ongoing w/u as OP re: underlying cause.  Supposedly saw endo as OP non-PTH mediated reports s/p 24H urine x 2.  Elevated Vitamin D 1,25 >200.  Reports PET scan and MRI were c/f MM however s/p bone marrow x 2 that is negative, seen by angelita Baltazar at [place].  Recently referred to Dr. Tosha ANDRADE (pul, [facility]) for r/o sarcoid.  Was doing w/u and labs and that is where Ca was again noted to be elevated w/ TOOTIE so sent in. Per d/w pulmonologist atypical presentation for sarcoid however ultimately diagnosis would require tissue diagnosis, has discordance with PET and MRI imaging, states lesions in bones and muscle and his plan was to bx one of these sites. Also concern for rheumatologic issue such as RA or polymyositis. OP w/u limited by hyperCa.  - Endo c/s called re: temporizing med (?bisphosphonate) to prevent recurrence while etiology being determined, endo recs: patient may need steroids if calcitriol mediated. Bisphosphonates are less likely to be as effective    TOOTIE (acute kidney injury)  - [place] 2/2 elevated Ca  - c/w IVF, unclear recent baseline    Patient requesting discharge home tonight given Ca has normalized, wishes to proceed with outpatient follow up for further workup. Risks of recurrent hypercalcemia discussed with patient in detail, benefits of admission and expedited inpatient workup including biopsy offered and discussed with patient. Patient is A&O x3 and has full capacity to make her own medical decisions. After expressing full understanding, patient wishes to discharge home for outpatient follow up of hypercalcemia and TOOTIE.    Patient seen and evaluated. Reviewed discharge medications with patient and attending. All new medications requiring new prescriptions were sent to the pharmacy of patient's choice. Reviewed need for prescription for previous home medications and new prescriptions sent if requested. Medically cleared/stable for discharge as per Dr. Grier on 6/29/2022 with appropriate follow up. Patient understands and agrees with plan of care. 62 F with HTN and recurrent hypercalcemia p/w elevated Ca and TOOTIE.    Hypercalcemia:  Started in Nov 2021 to be notable high.  Initially PCP thought due to BP meds (thiazide) however despite dc persisted.  Has been going extensive OP w/u with endocrine and hematology (Dr. Baltazar MM specialist at [place]).  Reports having a CT chest, a lung bx, urine Ca, PET scan x2, MRI whole body, bone marrow x 2.   Has elevated Vitamin D 1,25 >200.  Recently referred to Dr. Tosha ANDRADE (pulm, [facility]) for r/o sarcoid.  Was doing w/u and labs and that is where Ca was again noted to be elevated w/ TOOTIE so sent in. Per d/w OP pulmonologist atypical presentation for sarcoid however ultimately diagnosis would require tissue diagnosis, has discordance with PET and MRI imaging, states lesions in bones and muscle and his plan was to bx one of these sites. Also concern for rheumatologic issue such as RA or polymyositis (no sx, no joint pains/stiffness or muscle pain). OP w/u was limited by hyperCa and sent in.   - asymptomatic hypercalcemia on outpatient labs, on admission Ca 14.1, now Ca --> 11.5 s/p calcitonin and 2L IVF  - Endo c/s called re: temporizing med (?bisphosphonate) to prevent recurrence while etiology being determined, endo recs: patient may need steroids if calcitriol mediated. Bisphosphonates are less likely to be as effective  - pt w/o interest in staying IP for further eval     TOOTIE (acute kidney injury): likely 2/2 elevated Ca  - May Cr 1.37 now 2  - downtrending but not yet at baseline  - pt does not want to stay admitted, reports eating and drinking and will f/u OP    Patient requesting discharge home tonight given Ca has normalized, wishes to proceed with outpatient follow up for further workup. Risks of recurrent hypercalcemia discussed with patient in detail, benefits of admission and expedited inpatient workup including biopsy offered and discussed with patient. Patient is A&O x3 and has full capacity to make her own medical decisions. After expressing full understanding, patient wishes to discharge home for outpatient follow up of hypercalcemia and TOOTIE.    Patient seen and evaluated. Reviewed discharge medications with patient and attending. All new medications requiring new prescriptions were sent to the pharmacy of patient's choice. Reviewed need for prescription for previous home medications and new prescriptions sent if requested. Medically cleared/stable for discharge as per Dr. Grier on 6/29/2022 with appropriate follow up. Patient understands and agrees with plan of care.

## 2022-06-29 NOTE — ED ADULT NURSE REASSESSMENT NOTE - NS ED NURSE REASSESS COMMENT FT1
Report given to [facility], pt in no apparent distress. respiration even and non-labored. ED tech transporting patient to [facility] 2

## 2022-09-28 NOTE — ED PROVIDER NOTE - PATIENT PORTAL LINK FT
Mother had appointment today, clarified that patient is on Qvar 80mg 2 puffs twice a day. Sent new rx. You can access the FollowMyHealth Patient Portal offered by [facility] by registering at the following website: [email or web address]. By joining [facility]’s FollowMyHealth portal, you will also be able to view your health information using other applications (apps) compatible with our system.

## 2022-10-11 ENCOUNTER — APPOINTMENT (OUTPATIENT)
Dept: OBGYN | Facility: CLINIC | Age: 62
End: 2022-10-11

## 2022-10-11 VITALS
HEIGHT: 65 IN | WEIGHT: 179 LBS | HEART RATE: 89 BPM | DIASTOLIC BLOOD PRESSURE: 109 MMHG | SYSTOLIC BLOOD PRESSURE: 202 MMHG | BODY MASS INDEX: 29.82 KG/M2

## 2022-10-11 DIAGNOSIS — D86.9 SARCOIDOSIS, UNSPECIFIED: ICD-10-CM

## 2022-10-11 PROCEDURE — 99396 PREV VISIT EST AGE 40-64: CPT

## 2022-10-11 RX ORDER — OSELTAMIVIR PHOSPHATE 75 MG/1
75 CAPSULE ORAL
Qty: 10 | Refills: 0 | Status: COMPLETED | COMMUNITY
Start: 2017-01-25 | End: 2022-10-11

## 2022-10-11 RX ORDER — BROMPHENIRAMINE MALEATE, PSEUDOEPHEDRINE HYDROCHLORIDE, 2; 30; 10 MG/5ML; MG/5ML; MG/5ML
30-2-10 SYRUP ORAL
Qty: 200 | Refills: 0 | Status: COMPLETED | COMMUNITY
Start: 2017-01-25 | End: 2022-10-11

## 2022-10-11 RX ORDER — TELMISARTAN AND HYDROCHLOROTHIAZIDE 40; 12.5 MG/1; MG/1
40-12.5 TABLET ORAL
Qty: 90 | Refills: 0 | Status: COMPLETED | COMMUNITY
Start: 2016-09-21 | End: 2022-10-11

## 2022-10-11 RX ORDER — BENZONATATE 200 MG/1
200 CAPSULE ORAL
Qty: 20 | Refills: 0 | Status: COMPLETED | COMMUNITY
Start: 2017-01-25 | End: 2022-10-11

## 2022-11-02 ENCOUNTER — APPOINTMENT (OUTPATIENT)
Dept: PODIATRY | Facility: CLINIC | Age: 62
End: 2022-11-02

## 2022-11-02 DIAGNOSIS — M20.10 HALLUX VALGUS (ACQUIRED), UNSPECIFIED FOOT: ICD-10-CM

## 2022-11-02 DIAGNOSIS — M77.42 METATARSALGIA, LEFT FOOT: ICD-10-CM

## 2022-11-02 DIAGNOSIS — M79.672 PAIN IN LEFT FOOT: ICD-10-CM

## 2022-11-02 PROCEDURE — 99203 OFFICE O/P NEW LOW 30 MIN: CPT

## 2022-11-02 RX ORDER — PREDNISONE 5 MG/1
5 TABLET ORAL
Qty: 180 | Refills: 0 | Status: ACTIVE | COMMUNITY
Start: 2022-08-01

## 2022-11-02 RX ORDER — MELOXICAM 15 MG/1
15 TABLET ORAL DAILY
Qty: 7 | Refills: 0 | Status: ACTIVE | COMMUNITY
Start: 2022-11-02 | End: 1900-01-01

## 2022-11-02 RX ORDER — METOPROLOL SUCCINATE 25 MG/1
25 TABLET, EXTENDED RELEASE ORAL
Refills: 0 | Status: ACTIVE | COMMUNITY

## 2022-11-02 RX ORDER — METHOTREXATE 2.5 MG/1
TABLET ORAL
Refills: 0 | Status: ACTIVE | COMMUNITY

## 2022-11-02 RX ORDER — PREDNISONE 2.5 MG/1
2.5 TABLET ORAL
Qty: 90 | Refills: 0 | Status: ACTIVE | COMMUNITY
Start: 2022-10-21

## 2022-11-02 RX ORDER — VITAMIN K2 90 MCG
125 MCG CAPSULE ORAL
Qty: 60 | Refills: 0 | Status: ACTIVE | COMMUNITY
Start: 2022-05-13

## 2022-11-02 RX ORDER — FOLIC ACID 1 MG/1
1 TABLET ORAL
Qty: 90 | Refills: 0 | Status: ACTIVE | COMMUNITY
Start: 2022-08-01

## 2022-11-03 PROBLEM — M77.42 METATARSALGIA OF LEFT FOOT: Status: ACTIVE | Noted: 2022-11-02

## 2022-11-05 PROBLEM — D86.9 SARCOIDOSIS: Status: ACTIVE | Noted: 2022-11-02

## 2022-11-05 LAB
CYTOLOGY CVX/VAG DOC THIN PREP: ABNORMAL
HPV HIGH+LOW RISK DNA PNL CVX: NOT DETECTED

## 2022-11-07 PROBLEM — M79.672 LEFT FOOT PAIN: Status: ACTIVE | Noted: 2022-11-03

## 2022-11-07 PROBLEM — M20.10 HALLUX VALGUS: Status: ACTIVE | Noted: 2022-11-03

## 2022-11-07 NOTE — ASSESSMENT
[FreeTextEntry1] : Impression: Pain in left foot (M79).  Bunion deformity.  Metatarsalgia, left (M77.4),  Capsulitis, left 2nd toe (M77.5).  Hallus Valgus (M20.1)\par \par Treatment: Discussed etiology and treatment options.   \par Patient was advised to limit walking to essentials and always wear comfortable running sneakers.  Avoid barefoot walking.  Intermittently ice the area.  She can use an over-the-counter compression sleeve to control the swelling.  I dispensed metatarsal pads for her to wear.  She can also get them in pharmacies and online.  \par Prescribed Mobic to be taken instead of Motrin, as needed with food.  \par Return: 2 weeks, if she still has pain.  \par The x-ray finding of healed left 3rd digit fracture is not symptomatic.  Did not recommend any treatment for that.  The bunion finding as well is asymptomatic.

## 2022-11-07 NOTE — PHYSICAL EXAM
[2+] : left foot dorsalis pedis 2+ [Sensation] : the sensory exam was normal to light touch and pinprick [No Focal Deficits] : no focal deficits [Deep Tendon Reflexes (DTR)] : deep tendon reflexes were 2+ and symmetric [Motor Exam] : the motor exam was normal [Ankle Swelling (On Exam)] : not present [FreeTextEntry1] : Telangiectasias: Present.   [FreeTextEntry3] : CFT: 3 seconds x 10.  Temperature gradient: warm to cool. [de-identified] : Muscle power: 5/5, all pedal groups.  ROM of all pedal joints is normal, non-painful, non-crepitant.  Lachman test: Negative, left 2nd toe.  Pain on palpation of the left metatarsal head, 2 especially, plantarly with mild swelling.  No bruising.  \par Mild bunion deformity present, left 1st MPJ with no pain. [Vibration Dec.] : normal vibratory sensation at the level of the toes [Position Sense Dec.] : normal position sense at the level of the toes [Diminished Throughout Right Foot] : normal sensation with monofilament testing throughout right foot [Diminished Throughout Left Foot] : normal sensation with monofilament testing throughout left foot

## 2022-11-07 NOTE — PROCEDURE
[FreeTextEntry1] : X-rays: (these were performed at [facility], today) There is noted to be old, healed, left 3rd proximal phalanx non-displaced fracture.  Of note, that is not an area of pain for the patient.  Mild joint space narrowing of the midfoot.  No acute fractures/dislocations or erosions.  Mild bunion deformity present.

## 2022-11-07 NOTE — HISTORY OF PRESENT ILLNESS
[FreeTextEntry1] : Patient presents today for evaluation of left plantar, dorsal and forefoot pain that started about a week ago.  Patient gets pain, especially when walking and she noticed swelling to the area.  She goes on walks after work.  She is not very physically active during work.  She always wears sneakers when she walks.  Patient denies any trauma to the area and no precipitating factors that might have caused this pain. \par Patient has recently been diagnosed with Sarcoidosis and is on Methotrexate and Prednisone for it.  \par \par

## 2022-11-15 ENCOUNTER — APPOINTMENT (OUTPATIENT)
Dept: PODIATRY | Facility: CLINIC | Age: 62
End: 2022-11-15

## 2022-11-15 PROCEDURE — 20600 DRAIN/INJ JOINT/BURSA W/O US: CPT | Mod: LT

## 2022-11-25 NOTE — PHYSICAL EXAM
[2+] : left foot dorsalis pedis 2+ [Sensation] : the sensory exam was normal to light touch and pinprick [No Focal Deficits] : no focal deficits [Deep Tendon Reflexes (DTR)] : deep tendon reflexes were 2+ and symmetric [Motor Exam] : the motor exam was normal [Ankle Swelling (On Exam)] : not present [FreeTextEntry1] : Telangiectasias: Present.   [FreeTextEntry3] : CFT: 3 seconds x 10.  Temperature gradient: warm to cool. [de-identified] : Muscle power: 5/5, all pedal groups.  ROM of all pedal joints is normal, non-painful, non-crepitant.  Lachman test: Negative, left 2nd toe.  Pain on palpation of the left metatarsal head, 2 especially, plantarly with mild swelling.  No bruising.  \par Mild bunion deformity present, left 1st MPJ with no pain. [Vibration Dec.] : normal vibratory sensation at the level of the toes [Position Sense Dec.] : normal position sense at the level of the toes [Diminished Throughout Right Foot] : normal sensation with monofilament testing throughout right foot [Diminished Throughout Left Foot] : normal sensation with monofilament testing throughout left foot

## 2022-11-25 NOTE — HISTORY OF PRESENT ILLNESS
[Sneakers] : ismael [FreeTextEntry1] : Patient presents today for management of 2nd digit capsulitis, left.  She states that it is better.  She has finished the Mobic.  Pain is about 4/10.  She will be flying next week to visit her grandchildren and is going to increase her physical activities.  She has been using the metatarsal pads and supportive sneakers and avoiding barefoot walking.  \par

## 2022-11-25 NOTE — ASSESSMENT
[FreeTextEntry1] : Impression: Pain in left foot (M79).  Bunion deformity.  Metatarsalgia, left (M77.4),  Capsulitis, left 2nd toe (M77.5).  Hallus Valgus (M20.1)\par \par Treatment: I gave the patient the option to have an injection or physical therapy and patient elected to proceed with the steroid injection.  \par Location: Left 2nd MPJ\par Patient was placed in the treatment chair in supine position.  After obtaining verbal consent, a time out was performed to the identified area of maximal tenderness over the joint site.  The foot was prepped utilizing 70% alcohol solution.  \par A local injection consisting of 0.5cc of 0.25% Bupivacaine, 2mg dexamethasone was infiltrated into the joint.  Improvement in pain was observed following the injection. \par Patient was advised to apply intermittent ice when she gets home. \par Return: 3 weeks.

## 2022-12-06 ENCOUNTER — APPOINTMENT (OUTPATIENT)
Dept: PODIATRY | Facility: CLINIC | Age: 62
End: 2022-12-06

## 2022-12-06 DIAGNOSIS — M77.52 OTHER ENTHESOPATHY OF LT FOOT AND ANKLE: ICD-10-CM

## 2022-12-06 PROCEDURE — 99212 OFFICE O/P EST SF 10 MIN: CPT

## 2022-12-08 PROBLEM — M77.52 CAPSULITIS OF TOE OF LEFT FOOT: Status: ACTIVE | Noted: 2022-11-03

## 2022-12-13 NOTE — ASSESSMENT
[FreeTextEntry1] : Impression: Pain in left foot (M79).  Bunion deformity.  Metatarsalgia, left (M77.4),  Capsulitis, left 2nd toe (M77.5).  Hallus Valgus (M20.1)\par \par Treatment: I advised patient to continue with the proper shoe gear; avoid barefoot walking; continue to use the metatarsal pads to off-load and support the area.  \par Return: If pain significantly worsens or she would like a repeat injection; however, would not advise it at this time, as she has been getting better.

## 2022-12-13 NOTE — HISTORY OF PRESENT ILLNESS
[FreeTextEntry1] : Patient presents today for management of 2nd digit capsulitis, left.  She states that she felt significantly better after the last visit, when she had a steroid injection into the left 2nd MPJ.  She has been using the metatarsal pads and wearing shoes with a adequate supportive padding.  She has not been barefoot walking.  Patient states that the pain has improved by 90%.\par

## 2022-12-13 NOTE — PHYSICAL EXAM
[2+] : left foot dorsalis pedis 2+ [Sensation] : the sensory exam was normal to light touch and pinprick [No Focal Deficits] : no focal deficits [Deep Tendon Reflexes (DTR)] : deep tendon reflexes were 2+ and symmetric [Motor Exam] : the motor exam was normal [Ankle Swelling (On Exam)] : not present [FreeTextEntry1] : Telangiectasias: Present.   [FreeTextEntry3] : CFT: 3 seconds x 10.  Temperature gradient: warm to cool. [de-identified] : Muscle power: 5/5, all pedal groups.  ROM of all pedal joints is normal, non-painful, non-crepitant.  Lachman test: Negative, left 2nd toe.  Pain on palpation of the left metatarsal head, 2 especially, plantarly with mild swelling.  No bruising.  The pain has significantly improved.  \par Mild bunion deformity present, left 1st MPJ with no pain. [Vibration Dec.] : normal vibratory sensation at the level of the toes [Position Sense Dec.] : normal position sense at the level of the toes [Diminished Throughout Right Foot] : normal sensation with monofilament testing throughout right foot [Diminished Throughout Left Foot] : normal sensation with monofilament testing throughout left foot

## 2023-10-14 ENCOUNTER — NON-APPOINTMENT (OUTPATIENT)
Age: 63
End: 2023-10-14

## 2023-10-17 ENCOUNTER — APPOINTMENT (OUTPATIENT)
Dept: OBGYN | Facility: CLINIC | Age: 63
End: 2023-10-17
Payer: COMMERCIAL

## 2023-10-17 ENCOUNTER — APPOINTMENT (OUTPATIENT)
Dept: OBGYN | Facility: CLINIC | Age: 63
End: 2023-10-17

## 2023-10-17 VITALS — DIASTOLIC BLOOD PRESSURE: 119 MMHG | SYSTOLIC BLOOD PRESSURE: 187 MMHG

## 2023-10-17 VITALS
HEART RATE: 87 BPM | OXYGEN SATURATION: 97 % | HEIGHT: 65 IN | DIASTOLIC BLOOD PRESSURE: 103 MMHG | SYSTOLIC BLOOD PRESSURE: 168 MMHG | BODY MASS INDEX: 30.99 KG/M2 | WEIGHT: 186 LBS

## 2023-10-17 DIAGNOSIS — Z01.419 ENCOUNTER FOR GYNECOLOGICAL EXAMINATION (GENERAL) (ROUTINE) W/OUT ABNORMAL FINDINGS: ICD-10-CM

## 2023-10-17 PROCEDURE — 99396 PREV VISIT EST AGE 40-64: CPT

## 2023-10-19 LAB — HPV HIGH+LOW RISK DNA PNL CVX: NOT DETECTED

## 2023-10-23 LAB — CYTOLOGY CVX/VAG DOC THIN PREP: ABNORMAL

## 2024-10-23 ENCOUNTER — TRANSCRIPTION ENCOUNTER (OUTPATIENT)
Age: 64
End: 2024-10-23

## 2024-10-31 ENCOUNTER — NON-APPOINTMENT (OUTPATIENT)
Age: 64
End: 2024-10-31

## 2024-10-31 ENCOUNTER — APPOINTMENT (OUTPATIENT)
Dept: OBGYN | Facility: CLINIC | Age: 64
End: 2024-10-31
Payer: COMMERCIAL

## 2024-10-31 VITALS
OXYGEN SATURATION: 96 % | BODY MASS INDEX: 30.82 KG/M2 | HEIGHT: 65 IN | WEIGHT: 185 LBS | HEART RATE: 77 BPM | SYSTOLIC BLOOD PRESSURE: 179 MMHG | DIASTOLIC BLOOD PRESSURE: 94 MMHG

## 2024-10-31 DIAGNOSIS — Z01.419 ENCOUNTER FOR GYNECOLOGICAL EXAMINATION (GENERAL) (ROUTINE) W/OUT ABNORMAL FINDINGS: ICD-10-CM

## 2024-10-31 PROCEDURE — 99396 PREV VISIT EST AGE 40-64: CPT

## 2024-11-06 ENCOUNTER — TRANSCRIPTION ENCOUNTER (OUTPATIENT)
Age: 64
End: 2024-11-06

## 2024-11-06 LAB
CYTOLOGY CVX/VAG DOC THIN PREP: ABNORMAL
HPV HIGH+LOW RISK DNA PNL CVX: NOT DETECTED

## 2025-05-09 NOTE — DISCHARGE NOTE PROVIDER - NS AS DC PROVIDER CONTACT Y/N MULTI
----- Message from Aarm Palumbo sent at 5/9/2025 12:58 PM EDT -----  Lipids are too high, switching to rosuv 40 and ezetimibe 10, check fasting lipids 2 months after starting these meds  ----- Message -----  From: Tsering Butler RN  Sent: 5/8/2025   2:54 PM EDT  To: Aram Palumbo MD    Called pt, she confirmed she is taking 40 mg atorvastatin once daily    Thanks  Tsering   ----- Message -----  From: Aram Palumbo MD  Sent: 5/8/2025   2:33 PM EDT  To: [id number] Clinical Support Staff    Her chol is high, could you check if she is taking a satin and what dose  ----- Message -----  From: Dima Pocket Results In  Sent: 5/8/2025   6:55 AM EDT  To: Aram Palumbo MD     Yes